# Patient Record
Sex: FEMALE | Race: WHITE | Employment: UNEMPLOYED | URBAN - METROPOLITAN AREA
[De-identification: names, ages, dates, MRNs, and addresses within clinical notes are randomized per-mention and may not be internally consistent; named-entity substitution may affect disease eponyms.]

---

## 2017-01-11 ENCOUNTER — OFFICE VISIT (OUTPATIENT)
Dept: INTERNAL MEDICINE CLINIC | Age: 77
End: 2017-01-11

## 2017-01-11 VITALS
BODY MASS INDEX: 26.68 KG/M2 | HEART RATE: 72 BPM | WEIGHT: 166 LBS | DIASTOLIC BLOOD PRESSURE: 78 MMHG | HEIGHT: 66 IN | RESPIRATION RATE: 18 BRPM | TEMPERATURE: 97.9 F | SYSTOLIC BLOOD PRESSURE: 120 MMHG

## 2017-01-11 DIAGNOSIS — Z01.818 PREOP EXAM FOR INTERNAL MEDICINE: Primary | ICD-10-CM

## 2017-01-11 DIAGNOSIS — M17.12 PRIMARY OSTEOARTHRITIS OF LEFT KNEE: ICD-10-CM

## 2017-01-11 PROCEDURE — 99244 OFF/OP CNSLTJ NEW/EST MOD 40: CPT | Performed by: INTERNAL MEDICINE

## 2017-01-11 PROCEDURE — 93000 ELECTROCARDIOGRAM COMPLETE: CPT | Performed by: INTERNAL MEDICINE

## 2017-01-11 RX ORDER — NAPROXEN 500 MG/1
TABLET ORAL
Status: ON HOLD | COMMUNITY
Start: 2016-12-15 | End: 2017-01-27 | Stop reason: HOSPADM

## 2017-01-13 ENCOUNTER — NURSE ONLY (OUTPATIENT)
Dept: INTERNAL MEDICINE CLINIC | Age: 77
End: 2017-01-13

## 2017-01-13 DIAGNOSIS — Z12.11 SPECIAL SCREENING FOR MALIGNANT NEOPLASM OF COLON: Primary | ICD-10-CM

## 2017-01-13 LAB
CONTROL: ABNORMAL
HEMOCCULT STL QL: POSITIVE

## 2017-01-13 PROCEDURE — 82274 ASSAY TEST FOR BLOOD FECAL: CPT | Performed by: INTERNAL MEDICINE

## 2017-01-16 ENCOUNTER — TELEPHONE (OUTPATIENT)
Dept: ORTHOPEDIC SURGERY | Age: 77
End: 2017-01-16

## 2017-01-18 ENCOUNTER — HOSPITAL ENCOUNTER (OUTPATIENT)
Dept: OTHER | Age: 77
Discharge: HOME | End: 2017-01-18
Attending: ORTHOPAEDIC SURGERY | Admitting: ORTHOPAEDIC SURGERY

## 2017-01-18 LAB
ABO/RH: NORMAL
ALBUMIN SERPL-MCNC: 3.9 G/DL (ref 3.4–5)
ANION GAP SERPL CALCULATED.3IONS-SCNC: 16 MMOL/L (ref 3–16)
ANTIBODY SCREEN: NORMAL
APTT: 35.1 SEC (ref 25.2–36.4)
BASOPHILS ABSOLUTE: 0.1 K/UL (ref 0–0.2)
BASOPHILS RELATIVE PERCENT: 1.3 %
BILIRUBIN URINE: NEGATIVE
BLOOD, URINE: NEGATIVE
BUN BLDV-MCNC: 28 MG/DL (ref 7–20)
CALCIUM SERPL-MCNC: 10 MG/DL (ref 8.3–10.6)
CHLORIDE BLD-SCNC: 105 MMOL/L (ref 99–110)
CLARITY: CLEAR
CO2: 24 MMOL/L (ref 21–32)
COLOR: YELLOW
CREAT SERPL-MCNC: 1 MG/DL (ref 0.6–1.2)
EOSINOPHILS ABSOLUTE: 0.3 K/UL (ref 0–0.6)
EOSINOPHILS RELATIVE PERCENT: 7.3 %
GFR AFRICAN AMERICAN: >60
GFR NON-AFRICAN AMERICAN: 54
GLUCOSE BLD-MCNC: 93 MG/DL (ref 70–99)
GLUCOSE URINE: NEGATIVE MG/DL
HCT VFR BLD CALC: 35.5 % (ref 36–48)
HEMOGLOBIN: 11.8 G/DL (ref 12–16)
INR BLD: 1.03 (ref 0.85–1.16)
KETONES, URINE: NEGATIVE MG/DL
LEUKOCYTE ESTERASE, URINE: NEGATIVE
LYMPHOCYTES ABSOLUTE: 0.9 K/UL (ref 1–5.1)
LYMPHOCYTES RELATIVE PERCENT: 20.7 %
MCH RBC QN AUTO: 31.2 PG (ref 26–34)
MCHC RBC AUTO-ENTMCNC: 33.1 G/DL (ref 31–36)
MCV RBC AUTO: 94.3 FL (ref 80–100)
MICROSCOPIC EXAMINATION: NORMAL
MONOCYTES ABSOLUTE: 0.3 K/UL (ref 0–1.3)
MONOCYTES RELATIVE PERCENT: 7.3 %
NEUTROPHILS ABSOLUTE: 2.9 K/UL (ref 1.7–7.7)
NEUTROPHILS RELATIVE PERCENT: 63.4 %
NITRITE, URINE: NEGATIVE
PDW BLD-RTO: 13.7 % (ref 12.4–15.4)
PH UA: 6.5
PLATELET # BLD: 166 K/UL (ref 135–450)
PMV BLD AUTO: 7.9 FL (ref 5–10.5)
POTASSIUM SERPL-SCNC: 4.4 MMOL/L (ref 3.5–5.1)
PROTEIN UA: NEGATIVE MG/DL
PROTHROMBIN TIME: 11.8 SEC (ref 9.8–13)
RBC # BLD: 3.76 M/UL (ref 4–5.2)
SODIUM BLD-SCNC: 145 MMOL/L (ref 136–145)
SPECIFIC GRAVITY UA: 1.01
TRANSFERRIN: 243 MG/DL (ref 200–360)
URINE TYPE: NORMAL
UROBILINOGEN, URINE: 0.2 E.U./DL
WBC # BLD: 4.5 K/UL (ref 4–11)

## 2017-01-19 ENCOUNTER — CARE COORDINATION (OUTPATIENT)
Dept: CASE MANAGEMENT | Age: 77
End: 2017-01-19

## 2017-01-19 LAB — URINE CULTURE, ROUTINE: NORMAL

## 2017-01-20 ENCOUNTER — PAT TELEPHONE (OUTPATIENT)
Dept: PREADMISSION TESTING | Age: 77
End: 2017-01-20

## 2017-01-20 VITALS — HEIGHT: 66 IN | WEIGHT: 166 LBS | BODY MASS INDEX: 26.68 KG/M2

## 2017-01-20 ASSESSMENT — PAIN DESCRIPTION - ORIENTATION: ORIENTATION: LEFT

## 2017-01-20 ASSESSMENT — PAIN DESCRIPTION - PAIN TYPE: TYPE: CHRONIC PAIN

## 2017-01-20 ASSESSMENT — PAIN DESCRIPTION - FREQUENCY: FREQUENCY: CONTINUOUS

## 2017-01-20 ASSESSMENT — PAIN DESCRIPTION - LOCATION: LOCATION: HIP

## 2017-01-20 ASSESSMENT — PAIN - FUNCTIONAL ASSESSMENT: PAIN_FUNCTIONAL_ASSESSMENT: 0-10

## 2017-01-20 ASSESSMENT — PAIN SCALES - GENERAL: PAINLEVEL_OUTOF10: 6

## 2017-01-26 PROBLEM — Z96.642 STATUS POST TOTAL REPLACEMENT OF LEFT HIP: Status: ACTIVE | Noted: 2017-01-26

## 2017-02-10 ENCOUNTER — OFFICE VISIT (OUTPATIENT)
Dept: ORTHOPEDIC SURGERY | Age: 77
End: 2017-02-10

## 2017-02-10 VITALS — WEIGHT: 166.01 LBS | HEIGHT: 66 IN | BODY MASS INDEX: 26.68 KG/M2

## 2017-02-10 DIAGNOSIS — Z96.642 STATUS POST TOTAL REPLACEMENT OF LEFT HIP: ICD-10-CM

## 2017-02-10 DIAGNOSIS — M25.552 LEFT HIP PAIN: Primary | ICD-10-CM

## 2017-02-10 PROCEDURE — 73502 X-RAY EXAM HIP UNI 2-3 VIEWS: CPT | Performed by: PHYSICIAN ASSISTANT

## 2017-02-10 PROCEDURE — 99024 POSTOP FOLLOW-UP VISIT: CPT | Performed by: PHYSICIAN ASSISTANT

## 2017-02-10 RX ORDER — AMOXICILLIN 500 MG/1
CAPSULE ORAL
Qty: 4 CAPSULE | Refills: 1 | Status: SHIPPED | OUTPATIENT
Start: 2017-02-10 | End: 2018-06-11 | Stop reason: ALTCHOICE

## 2017-03-10 ENCOUNTER — OFFICE VISIT (OUTPATIENT)
Dept: ORTHOPEDIC SURGERY | Age: 77
End: 2017-03-10

## 2017-03-10 VITALS — WEIGHT: 166.01 LBS | BODY MASS INDEX: 26.68 KG/M2 | HEIGHT: 66 IN

## 2017-03-10 DIAGNOSIS — Z96.642 STATUS POST TOTAL REPLACEMENT OF LEFT HIP: Primary | ICD-10-CM

## 2017-03-10 PROCEDURE — 99024 POSTOP FOLLOW-UP VISIT: CPT | Performed by: PHYSICIAN ASSISTANT

## 2017-03-13 ENCOUNTER — HOSPITAL ENCOUNTER (OUTPATIENT)
Dept: PHYSICAL THERAPY | Age: 77
Discharge: HOME | End: 2017-03-31
Admitting: ORTHOPAEDIC SURGERY

## 2017-03-16 ENCOUNTER — HOSPITAL ENCOUNTER (OUTPATIENT)
Dept: PHYSICAL THERAPY | Age: 77
Discharge: HOME | End: 2017-03-16
Admitting: ORTHOPAEDIC SURGERY

## 2017-03-20 ENCOUNTER — HOSPITAL ENCOUNTER (OUTPATIENT)
Dept: PHYSICAL THERAPY | Age: 77
Discharge: HOME | End: 2017-03-20
Admitting: ORTHOPAEDIC SURGERY

## 2017-03-22 ENCOUNTER — HOSPITAL ENCOUNTER (OUTPATIENT)
Dept: PHYSICAL THERAPY | Age: 77
Discharge: HOME | End: 2017-03-22
Admitting: ORTHOPAEDIC SURGERY

## 2017-03-27 ENCOUNTER — HOSPITAL ENCOUNTER (OUTPATIENT)
Dept: PHYSICAL THERAPY | Age: 77
Discharge: HOME | End: 2017-03-27
Admitting: ORTHOPAEDIC SURGERY

## 2017-03-30 ENCOUNTER — HOSPITAL ENCOUNTER (OUTPATIENT)
Dept: PHYSICAL THERAPY | Age: 77
Discharge: HOME | End: 2017-03-30
Admitting: ORTHOPAEDIC SURGERY

## 2017-04-03 ENCOUNTER — HOSPITAL ENCOUNTER (OUTPATIENT)
Dept: PHYSICAL THERAPY | Age: 77
Discharge: HOME | End: 2017-04-03
Admitting: ORTHOPAEDIC SURGERY

## 2017-04-07 ENCOUNTER — OFFICE VISIT (OUTPATIENT)
Dept: ORTHOPEDIC SURGERY | Age: 77
End: 2017-04-07

## 2017-04-07 VITALS — BODY MASS INDEX: 26.68 KG/M2 | HEIGHT: 66 IN | WEIGHT: 166.01 LBS

## 2017-04-07 DIAGNOSIS — M25.552 HIP PAIN, LEFT: Primary | ICD-10-CM

## 2017-04-07 DIAGNOSIS — Z96.642 STATUS POST TOTAL HIP REPLACEMENT, LEFT: ICD-10-CM

## 2017-04-07 PROCEDURE — 73502 X-RAY EXAM HIP UNI 2-3 VIEWS: CPT | Performed by: ORTHOPAEDIC SURGERY

## 2017-04-07 PROCEDURE — 99024 POSTOP FOLLOW-UP VISIT: CPT | Performed by: ORTHOPAEDIC SURGERY

## 2017-06-08 ENCOUNTER — OFFICE VISIT (OUTPATIENT)
Dept: INTERNAL MEDICINE CLINIC | Age: 77
End: 2017-06-08

## 2017-06-08 VITALS
RESPIRATION RATE: 16 BRPM | BODY MASS INDEX: 25.88 KG/M2 | HEIGHT: 66 IN | SYSTOLIC BLOOD PRESSURE: 106 MMHG | DIASTOLIC BLOOD PRESSURE: 68 MMHG | HEART RATE: 82 BPM | WEIGHT: 161 LBS

## 2017-06-08 DIAGNOSIS — Z00.00 MEDICARE ANNUAL WELLNESS VISIT, SUBSEQUENT: Primary | ICD-10-CM

## 2017-06-08 DIAGNOSIS — Z23 NEED FOR STREPTOCOCCUS PNEUMONIAE VACCINATION: ICD-10-CM

## 2017-06-08 DIAGNOSIS — D64.9 ANEMIA FOLLOWING SURGERY: ICD-10-CM

## 2017-06-08 LAB
HCT VFR BLD CALC: 35.9 % (ref 36–48)
HEMOGLOBIN: 11.6 G/DL (ref 12–16)

## 2017-06-08 PROCEDURE — 36415 COLL VENOUS BLD VENIPUNCTURE: CPT | Performed by: INTERNAL MEDICINE

## 2017-06-08 PROCEDURE — G0009 ADMIN PNEUMOCOCCAL VACCINE: HCPCS | Performed by: INTERNAL MEDICINE

## 2017-06-08 PROCEDURE — 90732 PPSV23 VACC 2 YRS+ SUBQ/IM: CPT | Performed by: INTERNAL MEDICINE

## 2017-06-08 PROCEDURE — G0439 PPPS, SUBSEQ VISIT: HCPCS | Performed by: INTERNAL MEDICINE

## 2017-06-08 ASSESSMENT — LIFESTYLE VARIABLES: HOW OFTEN DO YOU HAVE A DRINK CONTAINING ALCOHOL: 0

## 2017-06-08 ASSESSMENT — ANXIETY QUESTIONNAIRES: GAD7 TOTAL SCORE: 0

## 2017-06-08 ASSESSMENT — PATIENT HEALTH QUESTIONNAIRE - PHQ9: SUM OF ALL RESPONSES TO PHQ QUESTIONS 1-9: 0

## 2017-07-02 PROBLEM — E86.0 DEHYDRATION: Status: ACTIVE | Noted: 2017-07-02

## 2017-07-02 PROBLEM — D64.9 ANEMIA: Status: ACTIVE | Noted: 2017-07-02

## 2017-07-02 PROBLEM — E16.2 HYPOGLYCEMIA: Status: ACTIVE | Noted: 2017-07-02

## 2017-07-13 ENCOUNTER — OFFICE VISIT (OUTPATIENT)
Dept: INTERNAL MEDICINE CLINIC | Age: 77
End: 2017-07-13

## 2017-07-13 VITALS
DIASTOLIC BLOOD PRESSURE: 82 MMHG | WEIGHT: 165 LBS | HEART RATE: 68 BPM | RESPIRATION RATE: 16 BRPM | HEIGHT: 66 IN | SYSTOLIC BLOOD PRESSURE: 120 MMHG | BODY MASS INDEX: 26.52 KG/M2

## 2017-07-13 DIAGNOSIS — E16.2 HYPOGLYCEMIA: Primary | ICD-10-CM

## 2017-07-13 PROCEDURE — 99213 OFFICE O/P EST LOW 20 MIN: CPT | Performed by: INTERNAL MEDICINE

## 2017-07-25 ENCOUNTER — TELEPHONE (OUTPATIENT)
Dept: INTERNAL MEDICINE CLINIC | Age: 77
End: 2017-07-25

## 2017-07-25 DIAGNOSIS — E16.2 HYPOGLYCEMIA: Primary | ICD-10-CM

## 2018-04-05 ENCOUNTER — OFFICE VISIT (OUTPATIENT)
Dept: ORTHOPEDIC SURGERY | Age: 78
End: 2018-04-05

## 2018-04-05 VITALS — WEIGHT: 164.9 LBS | BODY MASS INDEX: 26.5 KG/M2 | HEIGHT: 66 IN

## 2018-04-05 DIAGNOSIS — M16.11 ARTHRITIS OF RIGHT HIP: ICD-10-CM

## 2018-04-05 DIAGNOSIS — Z96.642 STATUS POST TOTAL HIP REPLACEMENT, LEFT: ICD-10-CM

## 2018-04-05 DIAGNOSIS — M25.552 LEFT HIP PAIN: Primary | ICD-10-CM

## 2018-04-05 PROCEDURE — 99214 OFFICE O/P EST MOD 30 MIN: CPT | Performed by: ORTHOPAEDIC SURGERY

## 2018-06-11 ENCOUNTER — OFFICE VISIT (OUTPATIENT)
Dept: INTERNAL MEDICINE CLINIC | Age: 78
End: 2018-06-11

## 2018-06-11 VITALS
HEIGHT: 66 IN | RESPIRATION RATE: 18 BRPM | WEIGHT: 185 LBS | DIASTOLIC BLOOD PRESSURE: 98 MMHG | BODY MASS INDEX: 29.73 KG/M2 | HEART RATE: 84 BPM | SYSTOLIC BLOOD PRESSURE: 142 MMHG

## 2018-06-11 DIAGNOSIS — Z23 NEED FOR PROPHYLACTIC VACCINATION AND INOCULATION AGAINST VARICELLA: ICD-10-CM

## 2018-06-11 DIAGNOSIS — Z00.00 MEDICARE ANNUAL WELLNESS VISIT, SUBSEQUENT: Primary | ICD-10-CM

## 2018-06-11 DIAGNOSIS — Z71.89 ACP (ADVANCE CARE PLANNING): ICD-10-CM

## 2018-06-11 DIAGNOSIS — Z00.00 ROUTINE GENERAL MEDICAL EXAMINATION AT A HEALTH CARE FACILITY: ICD-10-CM

## 2018-06-11 PROCEDURE — 99497 ADVNCD CARE PLAN 30 MIN: CPT | Performed by: INTERNAL MEDICINE

## 2018-06-11 PROCEDURE — G0439 PPPS, SUBSEQ VISIT: HCPCS | Performed by: INTERNAL MEDICINE

## 2018-06-11 ASSESSMENT — ANXIETY QUESTIONNAIRES: GAD7 TOTAL SCORE: 0

## 2018-06-11 ASSESSMENT — LIFESTYLE VARIABLES: HOW OFTEN DO YOU HAVE A DRINK CONTAINING ALCOHOL: 0

## 2018-06-11 ASSESSMENT — PATIENT HEALTH QUESTIONNAIRE - PHQ9: SUM OF ALL RESPONSES TO PHQ QUESTIONS 1-9: 0

## 2018-09-10 ENCOUNTER — OFFICE VISIT (OUTPATIENT)
Dept: ORTHOPEDIC SURGERY | Age: 78
End: 2018-09-10

## 2018-09-10 VITALS
HEIGHT: 66 IN | HEART RATE: 72 BPM | WEIGHT: 185 LBS | BODY MASS INDEX: 29.73 KG/M2 | SYSTOLIC BLOOD PRESSURE: 123 MMHG | DIASTOLIC BLOOD PRESSURE: 74 MMHG

## 2018-09-10 DIAGNOSIS — M16.11 ARTHRITIS OF RIGHT HIP: ICD-10-CM

## 2018-09-10 DIAGNOSIS — M25.551 RIGHT HIP PAIN: Primary | ICD-10-CM

## 2018-09-10 DIAGNOSIS — Z96.642 STATUS POST TOTAL HIP REPLACEMENT, LEFT: ICD-10-CM

## 2018-09-10 PROCEDURE — 99214 OFFICE O/P EST MOD 30 MIN: CPT | Performed by: ORTHOPAEDIC SURGERY

## 2018-09-10 NOTE — PROGRESS NOTES
surgery, possible inpatient rehabilitation facility placement versus going home with home physical therapy. We also talked about visiting with [facility] postoperative physical therapy to regain range of motion and strength after the surgery. All questions were answered. The appropriate literature was given to the patient. 3. I have personally performed and/or participated in the history, exam and medical decision making and agree with all pertinent clinical information. I have also reviewed and agree with the past medical, family and social history unless otherwise noted. This dictation was performed with a verbal recognition program (DRAGON) and it was checked for errors. It is possible that there are still dictated errors within this office note. If so, please bring any errors to my attention for an addendum. All efforts were made to ensure that this office note is accurate.           Rakan Leigh MD

## 2018-09-26 PROBLEM — E86.0 DEHYDRATION: Status: RESOLVED | Noted: 2017-07-02 | Resolved: 2018-09-26

## 2018-09-28 ENCOUNTER — TELEPHONE (OUTPATIENT)
Dept: ORTHOPEDIC SURGERY | Age: 78
End: 2018-09-28

## 2018-09-28 NOTE — TELEPHONE ENCOUNTER
Loss Clinic Consult Ordered: Not Applicable    Date of [facility] Loss Clinic Appt:     BMI at time of surgery (if went through [facility]): Additional Medical Concerns:     None    Additional Recommendations for above concerns:      Discharge Disposition Information:     Attended Pre-Hab Program: NA     Anticipated Discharge Disposition:  Home with Marcus Barriga per pre-hab assessment     Who will be with patient at home following discharge?         Equipment pt already has:  Regino Eason on first or second floor: First   Bathroom on first or second floor: First   Weight bearing status: Full   Pre-op ambulatory status: none   Number of entry steps: 1 step   Caregiver assistance: Full time        Oscar Smith  9/28/2018

## 2018-10-09 ENCOUNTER — OFFICE VISIT (OUTPATIENT)
Dept: INTERNAL MEDICINE CLINIC | Age: 78
End: 2018-10-09
Payer: MEDICARE

## 2018-10-09 VITALS
HEART RATE: 68 BPM | TEMPERATURE: 98.1 F | SYSTOLIC BLOOD PRESSURE: 122 MMHG | DIASTOLIC BLOOD PRESSURE: 60 MMHG | HEIGHT: 65 IN | WEIGHT: 183 LBS | OXYGEN SATURATION: 98 % | BODY MASS INDEX: 30.49 KG/M2

## 2018-10-09 DIAGNOSIS — Z23 NEED FOR INFLUENZA VACCINATION: ICD-10-CM

## 2018-10-09 DIAGNOSIS — M16.11 ARTHRITIS OF RIGHT HIP: ICD-10-CM

## 2018-10-09 DIAGNOSIS — Z01.818 PREOP EXAM FOR INTERNAL MEDICINE: Primary | ICD-10-CM

## 2018-10-09 PROCEDURE — 99214 OFFICE O/P EST MOD 30 MIN: CPT | Performed by: INTERNAL MEDICINE

## 2018-10-09 PROCEDURE — G0008 ADMIN INFLUENZA VIRUS VAC: HCPCS | Performed by: INTERNAL MEDICINE

## 2018-10-09 PROCEDURE — 90662 IIV NO PRSV INCREASED AG IM: CPT | Performed by: INTERNAL MEDICINE

## 2018-10-09 PROCEDURE — 93000 ELECTROCARDIOGRAM COMPLETE: CPT | Performed by: INTERNAL MEDICINE

## 2018-10-09 RX ORDER — NAPROXEN SODIUM 220 MG
220 TABLET ORAL DAILY PRN
COMMUNITY
End: 2018-10-29 | Stop reason: ALTCHOICE

## 2018-10-09 NOTE — PROGRESS NOTES
 Status post total replacement of left hip    Hypoglycemia    Anemia       Past Medical History:   Diagnosis Date    Arthritis of left hip     Cancer ([facility] [address] 2014    basal cell removed from nose    Chronic midline low back pain with left-sided sciatica     Hypoglycemia      Past Surgical History:   Procedure Laterality Date    JOINT REPLACEMENT Left 01/26/2017    LEFT TOTAL HIP REPLACEMENT WITH APPLICATION OF PLATELET [address]    WISDOM TOOTH EXTRACTION  11/2016     Family History   Problem Relation Age of Onset    Dementia Mother     Cancer Sister         Lung    Cancer Brother         Prostate    Arthritis Brother     Diabetes Paternal Aunt     Diabetes Paternal Grandmother     Cancer Brother         Lung    Cancer Brother         Esophagus     Social History     Social History    Marital status:      Spouse name: N/A    Number of children: N/A    Years of education: N/A     Occupational History    Not on file. Social History Main Topics    Smoking status: Never Smoker    Smokeless tobacco: Never Used    Alcohol use 0.0 oz/week      Comment: very rare    Drug use: No    Sexual activity: Yes     Partners: Male     Other Topics Concern    Not on file     Social History Narrative    No narrative on file       Review of Systems  A comprehensive review of systems was negative except for what was noted in the HPI. Physical Exam   Constitutional: She is oriented to person, place, and time. She appears well-developed and well-nourished. No distress. HENT:   Head: Normocephalic and atraumatic. Mouth/Throat: Uvula is midline, oropharynx is clear and moist and mucous membranes are normal.   Eyes: Conjunctivae and EOM are normal. Pupils are equal, round, and reactive to light. Neck: Trachea normal and normal range of motion. Neck supple. No JVD present. Carotid bruit is not present. No mass and no thyromegaly present.    Cardiovascular: Normal rate, regular rhythm, normal heart sounds and intact distal pulses. Exam reveals no gallop and no friction rub. No murmur heard. Pulmonary/Chest: Effort normal and breath sounds normal. No respiratory distress. She has no wheezes. She has no rales. Abdominal: Soft. Normal aorta and bowel sounds are normal. She exhibits no distension and no mass. There is no hepatosplenomegaly. No tenderness. Musculoskeletal: She exhibits no edema and no tenderness. Neurological: She is alert and oriented to person, place, and time. She has normal strength. No cranial nerve deficit or sensory deficit. Coordination and gait normal.   Skin: Skin is warm and dry. No rash noted. No erythema. Psychiatric: She has a normal mood and affect. Her behavior is normal.       Lab Results   Component Value Date    LABA1C 3.8 07/02/2017    LABMICR YES 07/01/2017     Lab Results   Component Value Date     07/02/2017    K 3.7 07/02/2017     07/02/2017    CO2 22 07/02/2017    BUN 23 (H) 07/02/2017    CREATININE 1.0 07/02/2017    GLUCOSE 60 (L) 07/02/2017    CALCIUM 9.6 07/02/2017     Lab Results   Component Value Date    CHOL 177 06/07/2016    TRIG 133 06/07/2016    HDL 51 06/07/2016    LDLCALC 99 06/07/2016     Lab Results   Component Value Date    ALT 11 07/01/2017    AST 15 07/01/2017     No results found for: TSH, T4FREE  Lab Results   Component Value Date    WBC 4.6 07/02/2017    HGB 11.2 (L) 07/02/2017    HCT 32.8 (L) 07/02/2017    MCV 90.6 07/02/2017     07/02/2017     Lab Results   Component Value Date    INR 1.03 01/18/2017      No results found for: PSA   No results found for: LABURIC       EKG Interpretation:  normal EKG, normal sinus rhythm without any acute ST, T changes     Assessment:       70 y.o. patient with planned surgery as above. SURGERY SPECIFIC RISK:  none  Known risk factors for perioperative complications: None  Current medications which may produce withdrawal symptoms if withheld perioperatively: none      Plan:     1. Preoperative workup as follows: ECG and labs per orthop  2. Change in medication regimen before surgery: Discontinue NSAIDs (Aleve) 7 days before surgery  3. Prophylaxis for cardiac events with perioperative beta-blockers: Not indicated  ACC/AHA indications for pre-operative beta-blocker use:    · Vascular surgery with history of postitive stress test  · Intermediate or high risk surgery with history of CAD   · Intermediate or high risk surgery with multiple clinical predictors of CAD- 2 of the following: history of compensated or prior heart failure, history of cerebrovascular disease, DM, or renal insufficiency    Routine administration of higher-dose, long-acting metoprolol in beta-blocker-naïve patients on the day of surgery, and in the absence of dose titration is associated with an overall increase in mortality. Beta-blockers should be started days to weeks prior to surgery and titrated to pulse < 70.  4. Deep vein thrombosis prophylaxis: regimen to be chosen by surgical team  5.  No contraindications to planned surgery    Ravi Menjivar was seen today for pre-op exam.    Diagnoses and all orders for this visit:    Preop exam for internal medicine  -     EKG 12 lead    Arthritis of right hip              [facility]  Office: [phone]  Fax: [phone]

## 2018-10-16 ENCOUNTER — TELEPHONE (OUTPATIENT)
Dept: ORTHOPEDIC SURGERY | Age: 78
End: 2018-10-16

## 2018-10-16 ENCOUNTER — HOSPITAL ENCOUNTER (OUTPATIENT)
Dept: PREADMISSION TESTING | Age: 78
Discharge: HOME | End: 2018-10-20
Payer: MEDICARE

## 2018-10-16 LAB
ALBUMIN SERPL-MCNC: 4.8 G/DL (ref 3.4–5)
ANION GAP SERPL CALCULATED.3IONS-SCNC: 17 MMOL/L (ref 3–16)
APTT: 32.5 SEC (ref 26–36)
BACTERIA: ABNORMAL /HPF
BASOPHILS ABSOLUTE: 0.1 K/UL (ref 0–0.2)
BASOPHILS RELATIVE PERCENT: 1.1 %
BILIRUBIN URINE: NEGATIVE
BLOOD, URINE: NEGATIVE
BUN BLDV-MCNC: 25 MG/DL (ref 7–20)
CALCIUM SERPL-MCNC: 10.2 MG/DL (ref 8.3–10.6)
CHLORIDE BLD-SCNC: 105 MMOL/L (ref 99–110)
CLARITY: CLEAR
CO2: 21 MMOL/L (ref 21–32)
COLOR: YELLOW
CREAT SERPL-MCNC: 1.4 MG/DL (ref 0.6–1.2)
EOSINOPHILS ABSOLUTE: 0.3 K/UL (ref 0–0.6)
EOSINOPHILS RELATIVE PERCENT: 6.3 %
EPITHELIAL CELLS, UA: ABNORMAL /HPF
GFR AFRICAN AMERICAN: 44
GFR NON-AFRICAN AMERICAN: 37
GLUCOSE BLD-MCNC: 93 MG/DL (ref 70–99)
GLUCOSE URINE: NEGATIVE MG/DL
HCT VFR BLD CALC: 33.6 % (ref 36–48)
HEMOGLOBIN: 11.3 G/DL (ref 12–16)
INR BLD: 0.97 (ref 0.86–1.14)
KETONES, URINE: NEGATIVE MG/DL
LEUKOCYTE ESTERASE, URINE: ABNORMAL
LYMPHOCYTES ABSOLUTE: 1 K/UL (ref 1–5.1)
LYMPHOCYTES RELATIVE PERCENT: 20 %
MCH RBC QN AUTO: 32.5 PG (ref 26–34)
MCHC RBC AUTO-ENTMCNC: 33.7 G/DL (ref 31–36)
MCV RBC AUTO: 96.6 FL (ref 80–100)
MICROSCOPIC EXAMINATION: YES
MONOCYTES ABSOLUTE: 0.3 K/UL (ref 0–1.3)
MONOCYTES RELATIVE PERCENT: 6.8 %
NEUTROPHILS ABSOLUTE: 3.2 K/UL (ref 1.7–7.7)
NEUTROPHILS RELATIVE PERCENT: 65.8 %
NITRITE, URINE: NEGATIVE
PDW BLD-RTO: 14 % (ref 12.4–15.4)
PH UA: 5.5
PLATELET # BLD: 187 K/UL (ref 135–450)
PMV BLD AUTO: 7.7 FL (ref 5–10.5)
POTASSIUM SERPL-SCNC: 4.6 MMOL/L (ref 3.5–5.1)
PROTEIN UA: NEGATIVE MG/DL
PROTHROMBIN TIME: 11.1 SEC (ref 9.8–13)
RBC # BLD: 3.48 M/UL (ref 4–5.2)
RBC UA: ABNORMAL /HPF (ref 0–2)
SODIUM BLD-SCNC: 143 MMOL/L (ref 136–145)
SPECIFIC GRAVITY UA: 1.01
TRANSFERRIN: 255 MG/DL (ref 200–360)
URINE TYPE: ABNORMAL
UROBILINOGEN, URINE: 0.2 E.U./DL
WBC # BLD: 4.9 K/UL (ref 4–11)
WBC UA: ABNORMAL /HPF (ref 0–5)

## 2018-10-16 PROCEDURE — 81001 URINALYSIS AUTO W/SCOPE: CPT

## 2018-10-16 PROCEDURE — 85730 THROMBOPLASTIN TIME PARTIAL: CPT

## 2018-10-16 PROCEDURE — 82040 ASSAY OF SERUM ALBUMIN: CPT

## 2018-10-16 PROCEDURE — 83036 HEMOGLOBIN GLYCOSYLATED A1C: CPT

## 2018-10-16 PROCEDURE — 87186 SC STD MICRODIL/AGAR DIL: CPT

## 2018-10-16 PROCEDURE — 87086 URINE CULTURE/COLONY COUNT: CPT

## 2018-10-16 PROCEDURE — 87077 CULTURE AEROBIC IDENTIFY: CPT

## 2018-10-16 PROCEDURE — 36415 COLL VENOUS BLD VENIPUNCTURE: CPT

## 2018-10-16 PROCEDURE — 85610 PROTHROMBIN TIME: CPT

## 2018-10-16 PROCEDURE — 80048 BASIC METABOLIC PNL TOTAL CA: CPT

## 2018-10-16 PROCEDURE — 85025 COMPLETE CBC W/AUTO DIFF WBC: CPT

## 2018-10-16 PROCEDURE — 84466 ASSAY OF TRANSFERRIN: CPT

## 2018-10-17 LAB
ESTIMATED AVERAGE GLUCOSE: 59.5 MG/DL
HBA1C MFR BLD: 3.7 %

## 2018-10-18 LAB
ORGANISM: ABNORMAL
URINE CULTURE, ROUTINE: ABNORMAL
URINE CULTURE, ROUTINE: ABNORMAL

## 2018-10-18 RX ORDER — CIPROFLOXACIN 500 MG/1
500 TABLET, FILM COATED ORAL 2 TIMES DAILY
Qty: 20 TABLET | Refills: 0 | Status: SHIPPED | OUTPATIENT
Start: 2018-10-18 | End: 2018-10-29

## 2018-10-24 ENCOUNTER — ANESTHESIA EVENT (OUTPATIENT)
Dept: OPERATING ROOM | Age: 78
DRG: 470 | End: 2018-10-24
Payer: MEDICARE

## 2018-10-24 NOTE — ANESTHESIA PRE PROCEDURE
Department of Anesthesiology  Preprocedure Note       Name:  Mary Jane Rodrigues   Age:  70 y.o.  :  1940                                          MRN:  [id number]         Date:  10/25/2018      Surgeon:  Ovidio Ng MD    Procedure: RIGHT TOTAL HIP REPLACEMENT WITH CELLSAVER AND PLATELET GEL                   HPI:  The patient is a 70 y.o. female who had a LEFT hip replacement done some time ago is doing very well and this regard. She now has disabling right hip pain secondary to severe arthritis of the right hip. The patient gives no low back history but she has been having some ipsilateral medial sided right knee pain. She had similar symptoms on the LEFT side before her surgery. No numbness or tingling.     Medications prior to admission:   ciprofloxacin (CIPRO) 500 MG tablet Take 1 tablet by mouth 2 times daily for 10 days   naproxen sodium (ALEVE) 220 MG tablet Take 220 mg by mouth daily as needed for Pain   Multiple Vitamins-Minerals (THERAPEUTIC MULTIVITAMIN-MINERALS) tablet Take 1 tablet by mouth daily   Cyanocobalamin (B-12) 100 MCG TABS Take by mouth   calcium carbonate (OSCAL) 500 MG TABS  Take 500 mg by mouth daily     Allergies:     Celebrex [Celecoxib]      Leg cramps    Sulfa Antibiotics Hives and Diarrhea     Problem List:     Chronic midline low back pain with left-sided sciatica M54.42, G89.29    Breast density R92.2    Hypoglycemia E16.2    Anemia D64.9    Arthritis of right hip M16.11     Past Medical History:     Arthritis of left hip     Cancer ([facility] [address]     basal cell removed from nose    Chronic midline low back pain with left-sided sciatica     Hypoglycemia     Status post total replacement of left hip 2017     Past Surgical History:     JOINT REPLACEMENT Left 2017    LEFT TOTAL HIP REPLACEMENT WITH APPLICATION OF PLATELET RICH    WISDOM TOOTH EXTRACTION  2016     Social History:     Smoking status: Never Smoker    Smokeless tobacco: Never Used    Alcohol use 0.0 oz/week      Comment: very rare     Vital Signs (Current):   BP: 139/76 Pulse: 96   Resp: 18 SpO2: 98   Temp: 98.2 °F (36.8 °C)   Height: 5' 5\" (1.651 m)  (10/25/18) Weight: 180 lb (81.6 kg)  (10/25/18)   BMI: 30                        BP Readings from Last 3 Encounters:   10/09/18 122/60   09/10/18 123/74   06/11/18 (!) 142/98     NPO Status: >8 hrs    EKG: Sinus  Rhythm 68; Nl axis; Nonspecific ST depression    CBC:    WBC 4.9 10/16/2018    HGB 11.3 10/16/2018    HCT 33.6 10/16/2018     10/16/2018     CMP:     10/16/2018    K 4.6 10/16/2018     10/16/2018    CO2 21 10/16/2018    BUN 25 10/16/2018    CREATININE 1.4 10/16/2018    GLUCOSE 93 10/16/2018    PROT 6.8 07/01/2017    CALCIUM 10.2 10/16/2018    BILITOT 0.4 07/01/2017    ALKPHOS 91 07/01/2017    AST 15 07/01/2017    ALT 11 07/01/2017     Coags:    PROTIME 11.1 10/16/2018    INR 0.97 10/16/2018    APTT 32.5 10/16/2018     Anesthesia Evaluation  Patient summary reviewed and Nursing notes reviewed  Airway: Mallampati: I  TM distance: >3 FB   Neck ROM: full  Mouth opening: > = 3 FB Dental: normal exam         Pulmonary:       (-) COPD, asthma, sleep apnea and not a current smoker                           Cardiovascular:  Exercise tolerance: good (>4 METS),       (-) hypertension,  angina and  CERON    ECG reviewed                        Neuro/Psych:   (+) neuromuscular disease:,    (-) TIA, headaches and psychiatric history            ROS comment: Chronic low back pain GI/Hepatic/Renal:   (+) renal disease: CRI,          ROS comment: Cr 1.4 (Stage III). Endo/Other:    (+) blood dyscrasia: anemia, arthritis: OA., .    (-) diabetes mellitus, hypothyroidism               Abdominal:           Vascular:     - DVT and PE. Anesthesia Plan      general     ASA 3     (FI block for post-op pain management)  Induction: intravenous. MIPS: Prophylactic antiemetics administered.   Anesthetic plan and risks

## 2018-10-25 ENCOUNTER — APPOINTMENT (OUTPATIENT)
Dept: GENERAL RADIOLOGY | Age: 78
DRG: 470 | End: 2018-10-25
Attending: ORTHOPAEDIC SURGERY
Payer: MEDICARE

## 2018-10-25 ENCOUNTER — ANESTHESIA (OUTPATIENT)
Dept: OPERATING ROOM | Age: 78
DRG: 470 | End: 2018-10-25
Payer: MEDICARE

## 2018-10-25 ENCOUNTER — HOSPITAL ENCOUNTER (INPATIENT)
Age: 78
LOS: 1 days | Discharge: HOME HEALTH | DRG: 470 | End: 2018-10-26
Attending: ORTHOPAEDIC SURGERY | Admitting: ORTHOPAEDIC SURGERY
Payer: MEDICARE

## 2018-10-25 VITALS
SYSTOLIC BLOOD PRESSURE: 126 MMHG | TEMPERATURE: 98.6 F | DIASTOLIC BLOOD PRESSURE: 85 MMHG | OXYGEN SATURATION: 100 % | RESPIRATION RATE: 21 BRPM

## 2018-10-25 DIAGNOSIS — Z96.641 STATUS POST TOTAL HIP REPLACEMENT, RIGHT: ICD-10-CM

## 2018-10-25 DIAGNOSIS — M16.11 PRIMARY OSTEOARTHRITIS OF RIGHT HIP: Primary | ICD-10-CM

## 2018-10-25 LAB
ABO/RH: NORMAL
ANION GAP SERPL CALCULATED.3IONS-SCNC: 15 MMOL/L (ref 3–16)
ANTIBODY SCREEN: NORMAL
BILIRUBIN URINE: NEGATIVE
BLOOD, URINE: NEGATIVE
BUN BLDV-MCNC: 23 MG/DL (ref 7–20)
CALCIUM SERPL-MCNC: 9.2 MG/DL (ref 8.3–10.6)
CHLORIDE BLD-SCNC: 104 MMOL/L (ref 99–110)
CLARITY: CLEAR
CO2: 19 MMOL/L (ref 21–32)
COLOR: YELLOW
CREAT SERPL-MCNC: 1.1 MG/DL (ref 0.6–1.2)
GFR AFRICAN AMERICAN: 58
GFR NON-AFRICAN AMERICAN: 48
GLUCOSE BLD-MCNC: 106 MG/DL (ref 70–99)
GLUCOSE BLD-MCNC: 233 MG/DL (ref 70–99)
GLUCOSE URINE: 250 MG/DL
KETONES, URINE: 15 MG/DL
LEUKOCYTE ESTERASE, URINE: NEGATIVE
MICROSCOPIC EXAMINATION: ABNORMAL
NITRITE, URINE: NEGATIVE
PERFORMED ON: ABNORMAL
PH UA: 6
POTASSIUM SERPL-SCNC: 3.9 MMOL/L (ref 3.5–5.1)
PROTEIN UA: NEGATIVE MG/DL
SODIUM BLD-SCNC: 138 MMOL/L (ref 136–145)
SPECIFIC GRAVITY UA: 1.02
URINE TYPE: ABNORMAL
UROBILINOGEN, URINE: 0.2 E.U./DL

## 2018-10-25 PROCEDURE — G8978 MOBILITY CURRENT STATUS: HCPCS

## 2018-10-25 PROCEDURE — 7100000001 HC PACU RECOVERY - ADDTL 15 MIN: Performed by: ORTHOPAEDIC SURGERY

## 2018-10-25 PROCEDURE — 88311 DECALCIFY TISSUE: CPT

## 2018-10-25 PROCEDURE — 2580000003 HC RX 258: Performed by: ANESTHESIOLOGY

## 2018-10-25 PROCEDURE — 2500000003 HC RX 250 WO HCPCS: Performed by: ANESTHESIOLOGY

## 2018-10-25 PROCEDURE — 73501 X-RAY EXAM HIP UNI 1 VIEW: CPT

## 2018-10-25 PROCEDURE — 86900 BLOOD TYPING SEROLOGIC ABO: CPT

## 2018-10-25 PROCEDURE — 6360000002 HC RX W HCPCS: Performed by: ORTHOPAEDIC SURGERY

## 2018-10-25 PROCEDURE — 3600000006 HC SURGERY LEVEL 6 BASE: Performed by: ORTHOPAEDIC SURGERY

## 2018-10-25 PROCEDURE — 6360000002 HC RX W HCPCS: Performed by: PHYSICIAN ASSISTANT

## 2018-10-25 PROCEDURE — 0SR901A REPLACEMENT OF RIGHT HIP JOINT WITH METAL SYNTHETIC SUBSTITUTE, UNCEMENTED, OPEN APPROACH: ICD-10-PCS | Performed by: ORTHOPAEDIC SURGERY

## 2018-10-25 PROCEDURE — 3700000001 HC ADD 15 MINUTES (ANESTHESIA): Performed by: ORTHOPAEDIC SURGERY

## 2018-10-25 PROCEDURE — 6360000002 HC RX W HCPCS: Performed by: ANESTHESIOLOGY

## 2018-10-25 PROCEDURE — 2500000003 HC RX 250 WO HCPCS: Performed by: NURSE ANESTHETIST, CERTIFIED REGISTERED

## 2018-10-25 PROCEDURE — 86850 RBC ANTIBODY SCREEN: CPT

## 2018-10-25 PROCEDURE — 97110 THERAPEUTIC EXERCISES: CPT

## 2018-10-25 PROCEDURE — 97166 OT EVAL MOD COMPLEX 45 MIN: CPT

## 2018-10-25 PROCEDURE — G8987 SELF CARE CURRENT STATUS: HCPCS

## 2018-10-25 PROCEDURE — G8979 MOBILITY GOAL STATUS: HCPCS

## 2018-10-25 PROCEDURE — 80048 BASIC METABOLIC PNL TOTAL CA: CPT

## 2018-10-25 PROCEDURE — 6370000000 HC RX 637 (ALT 250 FOR IP): Performed by: PHYSICIAN ASSISTANT

## 2018-10-25 PROCEDURE — 2720000010 HC SURG SUPPLY STERILE: Performed by: ORTHOPAEDIC SURGERY

## 2018-10-25 PROCEDURE — 6360000002 HC RX W HCPCS: Performed by: NURSE ANESTHETIST, CERTIFIED REGISTERED

## 2018-10-25 PROCEDURE — 3700000000 HC ANESTHESIA ATTENDED CARE: Performed by: ORTHOPAEDIC SURGERY

## 2018-10-25 PROCEDURE — 6370000000 HC RX 637 (ALT 250 FOR IP)

## 2018-10-25 PROCEDURE — C9290 INJ, BUPIVACAINE LIPOSOME: HCPCS | Performed by: ORTHOPAEDIC SURGERY

## 2018-10-25 PROCEDURE — 97162 PT EVAL MOD COMPLEX 30 MIN: CPT

## 2018-10-25 PROCEDURE — 7100000000 HC PACU RECOVERY - FIRST 15 MIN: Performed by: ORTHOPAEDIC SURGERY

## 2018-10-25 PROCEDURE — 2500000003 HC RX 250 WO HCPCS

## 2018-10-25 PROCEDURE — 2580000003 HC RX 258: Performed by: PHYSICIAN ASSISTANT

## 2018-10-25 PROCEDURE — 97535 SELF CARE MNGMENT TRAINING: CPT

## 2018-10-25 PROCEDURE — 81003 URINALYSIS AUTO W/O SCOPE: CPT

## 2018-10-25 PROCEDURE — 2709999900 HC NON-CHARGEABLE SUPPLY: Performed by: ORTHOPAEDIC SURGERY

## 2018-10-25 PROCEDURE — 36415 COLL VENOUS BLD VENIPUNCTURE: CPT

## 2018-10-25 PROCEDURE — G8988 SELF CARE GOAL STATUS: HCPCS

## 2018-10-25 PROCEDURE — 6370000000 HC RX 637 (ALT 250 FOR IP): Performed by: ANESTHESIOLOGY

## 2018-10-25 PROCEDURE — C1776 JOINT DEVICE (IMPLANTABLE): HCPCS | Performed by: ORTHOPAEDIC SURGERY

## 2018-10-25 PROCEDURE — 76942 ECHO GUIDE FOR BIOPSY: CPT | Performed by: ANESTHESIOLOGY

## 2018-10-25 PROCEDURE — 97116 GAIT TRAINING THERAPY: CPT

## 2018-10-25 PROCEDURE — 1200000000 HC SEMI PRIVATE

## 2018-10-25 PROCEDURE — 88304 TISSUE EXAM BY PATHOLOGIST: CPT

## 2018-10-25 PROCEDURE — 2500000003 HC RX 250 WO HCPCS: Performed by: ORTHOPAEDIC SURGERY

## 2018-10-25 PROCEDURE — 3600000016 HC SURGERY LEVEL 6 ADDTL 15MIN: Performed by: ORTHOPAEDIC SURGERY

## 2018-10-25 PROCEDURE — 2580000003 HC RX 258: Performed by: ORTHOPAEDIC SURGERY

## 2018-10-25 PROCEDURE — 86901 BLOOD TYPING SEROLOGIC RH(D): CPT

## 2018-10-25 PROCEDURE — 0QU40KZ SUPPLEMENT RIGHT ACETABULUM WITH NONAUTOLOGOUS TISSUE SUBSTITUTE, OPEN APPROACH: ICD-10-PCS | Performed by: ORTHOPAEDIC SURGERY

## 2018-10-25 DEVICE — R3 20 DEGREE XLPE ACETABULAR LINER                                    36MM INNER DIAMETER X OUTER DIAMETER 54MM
Type: IMPLANTABLE DEVICE | Site: HIP | Status: FUNCTIONAL
Brand: R3

## 2018-10-25 DEVICE — REFLECTION SPHERICAL HEAD SCREW 25MM
Type: IMPLANTABLE DEVICE | Site: HIP | Status: FUNCTIONAL
Brand: REFLECTION

## 2018-10-25 DEVICE — SYNERGY POROUS FEMORAL COMPONENT SZ 14
Type: IMPLANTABLE DEVICE | Site: HIP | Status: FUNCTIONAL
Brand: SYNERGY

## 2018-10-25 DEVICE — R3 3 HOLE ACETABULAR SHELL 54MM
Type: IMPLANTABLE DEVICE | Site: HIP | Status: FUNCTIONAL
Brand: R3 ACETABULAR

## 2018-10-25 DEVICE — COBALT CHROME 12/14 TAPER FEMORAL                                    HEAD 36MM +0: Type: IMPLANTABLE DEVICE | Site: HIP | Status: FUNCTIONAL

## 2018-10-25 RX ORDER — FENTANYL CITRATE 50 UG/ML
INJECTION, SOLUTION INTRAMUSCULAR; INTRAVENOUS PRN
Status: DISCONTINUED | OUTPATIENT
Start: 2018-10-25 | End: 2018-10-25 | Stop reason: SDUPTHER

## 2018-10-25 RX ORDER — SODIUM CHLORIDE, SODIUM LACTATE, POTASSIUM CHLORIDE, CALCIUM CHLORIDE 600; 310; 30; 20 MG/100ML; MG/100ML; MG/100ML; MG/100ML
INJECTION, SOLUTION INTRAVENOUS CONTINUOUS
Status: DISCONTINUED | OUTPATIENT
Start: 2018-10-25 | End: 2018-10-25

## 2018-10-25 RX ORDER — EPHEDRINE SULFATE 50 MG/ML
INJECTION INTRAVENOUS PRN
Status: DISCONTINUED | OUTPATIENT
Start: 2018-10-25 | End: 2018-10-25 | Stop reason: SDUPTHER

## 2018-10-25 RX ORDER — BUPIVACAINE HYDROCHLORIDE 2.5 MG/ML
INJECTION, SOLUTION EPIDURAL; INFILTRATION; INTRACAUDAL PRN
Status: DISCONTINUED | OUTPATIENT
Start: 2018-10-25 | End: 2018-10-25 | Stop reason: SDUPTHER

## 2018-10-25 RX ORDER — DEXAMETHASONE SODIUM PHOSPHATE 4 MG/ML
INJECTION, SOLUTION INTRA-ARTICULAR; INTRALESIONAL; INTRAMUSCULAR; INTRAVENOUS; SOFT TISSUE PRN
Status: DISCONTINUED | OUTPATIENT
Start: 2018-10-25 | End: 2018-10-25 | Stop reason: SDUPTHER

## 2018-10-25 RX ORDER — HYDRALAZINE HYDROCHLORIDE 20 MG/ML
5 INJECTION INTRAMUSCULAR; INTRAVENOUS EVERY 10 MIN PRN
Status: DISCONTINUED | OUTPATIENT
Start: 2018-10-25 | End: 2018-10-25 | Stop reason: HOSPADM

## 2018-10-25 RX ORDER — M-VIT,TX,IRON,MINS/CALC/FOLIC 27MG-0.4MG
1 TABLET ORAL DAILY
Status: DISCONTINUED | OUTPATIENT
Start: 2018-10-25 | End: 2018-10-26 | Stop reason: HOSPADM

## 2018-10-25 RX ORDER — CIPROFLOXACIN 500 MG/1
500 TABLET, FILM COATED ORAL 2 TIMES DAILY
Status: DISCONTINUED | OUTPATIENT
Start: 2018-10-25 | End: 2018-10-26 | Stop reason: HOSPADM

## 2018-10-25 RX ORDER — MORPHINE SULFATE 4 MG/ML
4 INJECTION, SOLUTION INTRAMUSCULAR; INTRAVENOUS
Status: DISCONTINUED | OUTPATIENT
Start: 2018-10-25 | End: 2018-10-26 | Stop reason: HOSPADM

## 2018-10-25 RX ORDER — SODIUM CHLORIDE 0.9 % (FLUSH) 0.9 %
10 SYRINGE (ML) INJECTION EVERY 12 HOURS SCHEDULED
Status: DISCONTINUED | OUTPATIENT
Start: 2018-10-25 | End: 2018-10-26 | Stop reason: HOSPADM

## 2018-10-25 RX ORDER — OXYCODONE HYDROCHLORIDE 5 MG/1
10 TABLET ORAL EVERY 4 HOURS PRN
Status: DISCONTINUED | OUTPATIENT
Start: 2018-10-25 | End: 2018-10-26 | Stop reason: HOSPADM

## 2018-10-25 RX ORDER — SODIUM CHLORIDE 0.9 % (FLUSH) 0.9 %
10 SYRINGE (ML) INJECTION PRN
Status: DISCONTINUED | OUTPATIENT
Start: 2018-10-25 | End: 2018-10-26 | Stop reason: HOSPADM

## 2018-10-25 RX ORDER — OXYCODONE HYDROCHLORIDE 5 MG/1
5 TABLET ORAL EVERY 4 HOURS PRN
Status: DISCONTINUED | OUTPATIENT
Start: 2018-10-25 | End: 2018-10-26 | Stop reason: HOSPADM

## 2018-10-25 RX ORDER — LIDOCAINE HYDROCHLORIDE 10 MG/ML
0.3 INJECTION, SOLUTION EPIDURAL; INFILTRATION; INTRACAUDAL; PERINEURAL
Status: DISCONTINUED | OUTPATIENT
Start: 2018-10-25 | End: 2018-10-25 | Stop reason: HOSPADM

## 2018-10-25 RX ORDER — GLYCOPYRROLATE 0.2 MG/ML
INJECTION INTRAMUSCULAR; INTRAVENOUS PRN
Status: DISCONTINUED | OUTPATIENT
Start: 2018-10-25 | End: 2018-10-25 | Stop reason: SDUPTHER

## 2018-10-25 RX ORDER — DOCUSATE SODIUM 100 MG/1
100 CAPSULE, LIQUID FILLED ORAL 2 TIMES DAILY
Status: DISCONTINUED | OUTPATIENT
Start: 2018-10-25 | End: 2018-10-26 | Stop reason: HOSPADM

## 2018-10-25 RX ORDER — SODIUM CHLORIDE 0.9 % (FLUSH) 0.9 %
10 SYRINGE (ML) INJECTION PRN
Status: DISCONTINUED | OUTPATIENT
Start: 2018-10-25 | End: 2018-10-25 | Stop reason: HOSPADM

## 2018-10-25 RX ORDER — SODIUM CHLORIDE 9 MG/ML
INJECTION, SOLUTION INTRAVENOUS CONTINUOUS
Status: DISCONTINUED | OUTPATIENT
Start: 2018-10-25 | End: 2018-10-26 | Stop reason: HOSPADM

## 2018-10-25 RX ORDER — ROCURONIUM BROMIDE 10 MG/ML
INJECTION, SOLUTION INTRAVENOUS PRN
Status: DISCONTINUED | OUTPATIENT
Start: 2018-10-25 | End: 2018-10-25 | Stop reason: SDUPTHER

## 2018-10-25 RX ORDER — TRANEXAMIC ACID 100 MG/ML
INJECTION, SOLUTION INTRAVENOUS PRN
Status: DISCONTINUED | OUTPATIENT
Start: 2018-10-25 | End: 2018-10-25 | Stop reason: SDUPTHER

## 2018-10-25 RX ORDER — FENTANYL CITRATE 50 UG/ML
25 INJECTION, SOLUTION INTRAMUSCULAR; INTRAVENOUS EVERY 5 MIN PRN
Status: DISCONTINUED | OUTPATIENT
Start: 2018-10-25 | End: 2018-10-25 | Stop reason: HOSPADM

## 2018-10-25 RX ORDER — ACETAMINOPHEN 500 MG
1000 TABLET ORAL ONCE
Status: COMPLETED | OUTPATIENT
Start: 2018-10-25 | End: 2018-10-25

## 2018-10-25 RX ORDER — MEPERIDINE HYDROCHLORIDE 50 MG/ML
12.5 INJECTION INTRAMUSCULAR; INTRAVENOUS; SUBCUTANEOUS EVERY 5 MIN PRN
Status: DISCONTINUED | OUTPATIENT
Start: 2018-10-25 | End: 2018-10-25 | Stop reason: HOSPADM

## 2018-10-25 RX ORDER — CALCIUM CARBONATE 500(1250)
500 TABLET ORAL DAILY
Status: DISCONTINUED | OUTPATIENT
Start: 2018-10-25 | End: 2018-10-26 | Stop reason: HOSPADM

## 2018-10-25 RX ORDER — OXYCODONE HYDROCHLORIDE AND ACETAMINOPHEN 5; 325 MG/1; MG/1
2 TABLET ORAL PRN
Status: DISCONTINUED | OUTPATIENT
Start: 2018-10-25 | End: 2018-10-25 | Stop reason: HOSPADM

## 2018-10-25 RX ORDER — HYDROMORPHONE HCL 110MG/55ML
PATIENT CONTROLLED ANALGESIA SYRINGE INTRAVENOUS PRN
Status: DISCONTINUED | OUTPATIENT
Start: 2018-10-25 | End: 2018-10-25 | Stop reason: SDUPTHER

## 2018-10-25 RX ORDER — MIDAZOLAM HYDROCHLORIDE 1 MG/ML
INJECTION INTRAMUSCULAR; INTRAVENOUS PRN
Status: DISCONTINUED | OUTPATIENT
Start: 2018-10-25 | End: 2018-10-25

## 2018-10-25 RX ORDER — ONDANSETRON 2 MG/ML
4 INJECTION INTRAMUSCULAR; INTRAVENOUS
Status: DISCONTINUED | OUTPATIENT
Start: 2018-10-25 | End: 2018-10-25 | Stop reason: HOSPADM

## 2018-10-25 RX ORDER — SODIUM CHLORIDE 0.9 % (FLUSH) 0.9 %
10 SYRINGE (ML) INJECTION EVERY 12 HOURS SCHEDULED
Status: DISCONTINUED | OUTPATIENT
Start: 2018-10-25 | End: 2018-10-25 | Stop reason: HOSPADM

## 2018-10-25 RX ORDER — OXYCODONE HYDROCHLORIDE AND ACETAMINOPHEN 5; 325 MG/1; MG/1
1 TABLET ORAL PRN
Status: DISCONTINUED | OUTPATIENT
Start: 2018-10-25 | End: 2018-10-25 | Stop reason: HOSPADM

## 2018-10-25 RX ORDER — ACETAMINOPHEN 325 MG/1
650 TABLET ORAL EVERY 4 HOURS PRN
Status: DISCONTINUED | OUTPATIENT
Start: 2018-10-25 | End: 2018-10-26 | Stop reason: HOSPADM

## 2018-10-25 RX ORDER — CYCLOBENZAPRINE HCL 10 MG
10 TABLET ORAL 3 TIMES DAILY PRN
Status: DISCONTINUED | OUTPATIENT
Start: 2018-10-25 | End: 2018-10-26 | Stop reason: HOSPADM

## 2018-10-25 RX ORDER — MIDAZOLAM HYDROCHLORIDE 1 MG/ML
INJECTION INTRAMUSCULAR; INTRAVENOUS PRN
Status: DISCONTINUED | OUTPATIENT
Start: 2018-10-25 | End: 2018-10-25 | Stop reason: SDUPTHER

## 2018-10-25 RX ORDER — ONDANSETRON 2 MG/ML
INJECTION INTRAMUSCULAR; INTRAVENOUS PRN
Status: DISCONTINUED | OUTPATIENT
Start: 2018-10-25 | End: 2018-10-25 | Stop reason: SDUPTHER

## 2018-10-25 RX ORDER — LIDOCAINE HYDROCHLORIDE 20 MG/ML
INJECTION, SOLUTION EPIDURAL; INFILTRATION; INTRACAUDAL; PERINEURAL PRN
Status: DISCONTINUED | OUTPATIENT
Start: 2018-10-25 | End: 2018-10-25 | Stop reason: SDUPTHER

## 2018-10-25 RX ORDER — MORPHINE SULFATE 2 MG/ML
2 INJECTION, SOLUTION INTRAMUSCULAR; INTRAVENOUS
Status: DISCONTINUED | OUTPATIENT
Start: 2018-10-25 | End: 2018-10-26 | Stop reason: HOSPADM

## 2018-10-25 RX ORDER — PROPOFOL 10 MG/ML
INJECTION, EMULSION INTRAVENOUS PRN
Status: DISCONTINUED | OUTPATIENT
Start: 2018-10-25 | End: 2018-10-25 | Stop reason: SDUPTHER

## 2018-10-25 RX ORDER — ONDANSETRON 2 MG/ML
4 INJECTION INTRAMUSCULAR; INTRAVENOUS EVERY 6 HOURS PRN
Status: DISCONTINUED | OUTPATIENT
Start: 2018-10-25 | End: 2018-10-26 | Stop reason: HOSPADM

## 2018-10-25 RX ADMIN — FENTANYL CITRATE 50 MCG: 50 INJECTION INTRAMUSCULAR; INTRAVENOUS at 09:20

## 2018-10-25 RX ADMIN — PHENYLEPHRINE HYDROCHLORIDE 100 MCG: 10 INJECTION INTRAVENOUS at 09:39

## 2018-10-25 RX ADMIN — FENTANYL CITRATE 50 MCG: 50 INJECTION INTRAMUSCULAR; INTRAVENOUS at 09:39

## 2018-10-25 RX ADMIN — ROCURONIUM BROMIDE 10 MG: 10 SOLUTION INTRAVENOUS at 10:19

## 2018-10-25 RX ADMIN — PHENYLEPHRINE HYDROCHLORIDE 100 MCG: 10 INJECTION INTRAVENOUS at 10:28

## 2018-10-25 RX ADMIN — CIPROFLOXACIN 500 MG: 500 TABLET, FILM COATED ORAL at 21:00

## 2018-10-25 RX ADMIN — PHENYLEPHRINE HYDROCHLORIDE 100 MCG: 10 INJECTION INTRAVENOUS at 10:26

## 2018-10-25 RX ADMIN — EPHEDRINE SULFATE 10 MG: 50 INJECTION INTRAVENOUS at 10:30

## 2018-10-25 RX ADMIN — SUGAMMADEX 100 MG: 100 INJECTION, SOLUTION INTRAVENOUS at 10:41

## 2018-10-25 RX ADMIN — SODIUM CHLORIDE, POTASSIUM CHLORIDE, SODIUM LACTATE AND CALCIUM CHLORIDE: 600; 310; 30; 20 INJECTION, SOLUTION INTRAVENOUS at 08:03

## 2018-10-25 RX ADMIN — DOCUSATE SODIUM 100 MG: 100 CAPSULE, LIQUID FILLED ORAL at 21:00

## 2018-10-25 RX ADMIN — GLYCOPYRROLATE 0.2 MG: 0.2 INJECTION, SOLUTION INTRAMUSCULAR; INTRAVENOUS at 10:27

## 2018-10-25 RX ADMIN — MIDAZOLAM HYDROCHLORIDE 2 MG: 2 INJECTION, SOLUTION INTRAMUSCULAR; INTRAVENOUS at 08:52

## 2018-10-25 RX ADMIN — SODIUM CHLORIDE, POTASSIUM CHLORIDE, SODIUM LACTATE AND CALCIUM CHLORIDE: 600; 310; 30; 20 INJECTION, SOLUTION INTRAVENOUS at 08:40

## 2018-10-25 RX ADMIN — ROCURONIUM BROMIDE 50 MG: 10 SOLUTION INTRAVENOUS at 09:20

## 2018-10-25 RX ADMIN — HYDROMORPHONE HYDROCHLORIDE 0.5 MG: 1 INJECTION, SOLUTION INTRAMUSCULAR; INTRAVENOUS; SUBCUTANEOUS at 11:40

## 2018-10-25 RX ADMIN — HYDROMORPHONE HYDROCHLORIDE 0.5 MG: 1 INJECTION, SOLUTION INTRAMUSCULAR; INTRAVENOUS; SUBCUTANEOUS at 11:31

## 2018-10-25 RX ADMIN — HYDROMORPHONE HYDROCHLORIDE 0.5 MG: 2 INJECTION INTRAMUSCULAR; INTRAVENOUS; SUBCUTANEOUS at 10:46

## 2018-10-25 RX ADMIN — PHENYLEPHRINE HYDROCHLORIDE 100 MCG: 10 INJECTION INTRAVENOUS at 10:05

## 2018-10-25 RX ADMIN — SODIUM CHLORIDE, POTASSIUM CHLORIDE, SODIUM LACTATE AND CALCIUM CHLORIDE: 600; 310; 30; 20 INJECTION, SOLUTION INTRAVENOUS at 10:42

## 2018-10-25 RX ADMIN — TRANEXAMIC ACID 1000 MG: 100 INJECTION, SOLUTION INTRAVENOUS at 09:30

## 2018-10-25 RX ADMIN — FENTANYL CITRATE 50 MCG: 50 INJECTION INTRAMUSCULAR; INTRAVENOUS at 09:15

## 2018-10-25 RX ADMIN — HYDROMORPHONE HYDROCHLORIDE 0.5 MG: 1 INJECTION, SOLUTION INTRAMUSCULAR; INTRAVENOUS; SUBCUTANEOUS at 11:21

## 2018-10-25 RX ADMIN — PROPOFOL 130 MG: 10 INJECTION, EMULSION INTRAVENOUS at 09:20

## 2018-10-25 RX ADMIN — EPHEDRINE SULFATE 10 MG: 50 INJECTION INTRAVENOUS at 10:50

## 2018-10-25 RX ADMIN — FENTANYL CITRATE 50 MCG: 50 INJECTION INTRAMUSCULAR; INTRAVENOUS at 09:21

## 2018-10-25 RX ADMIN — ONDANSETRON 4 MG: 2 INJECTION INTRAMUSCULAR; INTRAVENOUS at 09:44

## 2018-10-25 RX ADMIN — DEXAMETHASONE SODIUM PHOSPHATE 10 MG: 4 INJECTION, SOLUTION INTRAMUSCULAR; INTRAVENOUS at 09:44

## 2018-10-25 RX ADMIN — ACETAMINOPHEN 650 MG: 325 TABLET ORAL at 19:31

## 2018-10-25 RX ADMIN — FENTANYL CITRATE 50 MCG: 50 INJECTION INTRAMUSCULAR; INTRAVENOUS at 09:41

## 2018-10-25 RX ADMIN — LIDOCAINE HYDROCHLORIDE 60 MG: 20 INJECTION, SOLUTION EPIDURAL; INFILTRATION; INTRACAUDAL; PERINEURAL at 09:20

## 2018-10-25 RX ADMIN — SODIUM CHLORIDE, POTASSIUM CHLORIDE, SODIUM LACTATE AND CALCIUM CHLORIDE: 600; 310; 30; 20 INJECTION, SOLUTION INTRAVENOUS at 09:45

## 2018-10-25 RX ADMIN — ACETAMINOPHEN 1000 MG: 500 TABLET ORAL at 08:03

## 2018-10-25 RX ADMIN — Medication 2 G: at 09:15

## 2018-10-25 RX ADMIN — BUPIVACAINE HYDROCHLORIDE 50 ML: 2.5 INJECTION, SOLUTION EPIDURAL; INFILTRATION; INTRACAUDAL; PERINEURAL at 08:54

## 2018-10-25 RX ADMIN — SODIUM CHLORIDE, PRESERVATIVE FREE 10 ML: 5 INJECTION INTRAVENOUS at 11:21

## 2018-10-25 RX ADMIN — FENTANYL CITRATE 100 MCG: 50 INJECTION INTRAMUSCULAR; INTRAVENOUS at 08:52

## 2018-10-25 RX ADMIN — HYDROMORPHONE HYDROCHLORIDE 0.5 MG: 2 INJECTION INTRAMUSCULAR; INTRAVENOUS; SUBCUTANEOUS at 10:43

## 2018-10-25 RX ADMIN — Medication: at 15:07

## 2018-10-25 RX ADMIN — Medication 2 G: at 18:11

## 2018-10-25 RX ADMIN — Medication 10 ML: at 21:01

## 2018-10-25 RX ADMIN — PHENYLEPHRINE HYDROCHLORIDE 100 MCG: 10 INJECTION INTRAVENOUS at 09:45

## 2018-10-25 ASSESSMENT — PULMONARY FUNCTION TESTS
PIF_VALUE: 1
PIF_VALUE: 4
PIF_VALUE: 16
PIF_VALUE: 16
PIF_VALUE: 17
PIF_VALUE: 18
PIF_VALUE: 2
PIF_VALUE: 16
PIF_VALUE: 15
PIF_VALUE: 5
PIF_VALUE: 17
PIF_VALUE: 15
PIF_VALUE: 2
PIF_VALUE: 15
PIF_VALUE: 16
PIF_VALUE: 15
PIF_VALUE: 13
PIF_VALUE: 1
PIF_VALUE: 1
PIF_VALUE: 16
PIF_VALUE: 15
PIF_VALUE: 17
PIF_VALUE: 16
PIF_VALUE: 15
PIF_VALUE: 2
PIF_VALUE: 2
PIF_VALUE: 16
PIF_VALUE: 15
PIF_VALUE: 1
PIF_VALUE: 28
PIF_VALUE: 16
PIF_VALUE: 16
PIF_VALUE: 1
PIF_VALUE: 17
PIF_VALUE: 17
PIF_VALUE: 16
PIF_VALUE: 16
PIF_VALUE: 15
PIF_VALUE: 17
PIF_VALUE: 17
PIF_VALUE: 15
PIF_VALUE: 15
PIF_VALUE: 2
PIF_VALUE: 14
PIF_VALUE: 2
PIF_VALUE: 17
PIF_VALUE: 17
PIF_VALUE: 2
PIF_VALUE: 14
PIF_VALUE: 15
PIF_VALUE: 15
PIF_VALUE: 9
PIF_VALUE: 15
PIF_VALUE: 16
PIF_VALUE: 17
PIF_VALUE: 17
PIF_VALUE: 15
PIF_VALUE: 1
PIF_VALUE: 16
PIF_VALUE: 15
PIF_VALUE: 3
PIF_VALUE: 15
PIF_VALUE: 3
PIF_VALUE: 17
PIF_VALUE: 18
PIF_VALUE: 18
PIF_VALUE: 15
PIF_VALUE: 2
PIF_VALUE: 17
PIF_VALUE: 16
PIF_VALUE: 18
PIF_VALUE: 15
PIF_VALUE: 17
PIF_VALUE: 3
PIF_VALUE: 1
PIF_VALUE: 15
PIF_VALUE: 18
PIF_VALUE: 15
PIF_VALUE: 15
PIF_VALUE: 3
PIF_VALUE: 2
PIF_VALUE: 17
PIF_VALUE: 15
PIF_VALUE: 17
PIF_VALUE: 2
PIF_VALUE: 14
PIF_VALUE: 1
PIF_VALUE: 1
PIF_VALUE: 2
PIF_VALUE: 15
PIF_VALUE: 2
PIF_VALUE: 16
PIF_VALUE: 16
PIF_VALUE: 15
PIF_VALUE: 16
PIF_VALUE: 1
PIF_VALUE: 18
PIF_VALUE: 15
PIF_VALUE: 15
PIF_VALUE: 2
PIF_VALUE: 14
PIF_VALUE: 15

## 2018-10-25 ASSESSMENT — PAIN DESCRIPTION - DESCRIPTORS
DESCRIPTORS: ACHING;BURNING
DESCRIPTORS: DULL;RADIATING
DESCRIPTORS: ACHING
DESCRIPTORS: ACHING
DESCRIPTORS: ACHING;BURNING

## 2018-10-25 ASSESSMENT — PAIN DESCRIPTION - LOCATION
LOCATION: HIP

## 2018-10-25 ASSESSMENT — PAIN SCALES - GENERAL
PAINLEVEL_OUTOF10: 6
PAINLEVEL_OUTOF10: 1
PAINLEVEL_OUTOF10: 8
PAINLEVEL_OUTOF10: 9
PAINLEVEL_OUTOF10: 6
PAINLEVEL_OUTOF10: 4
PAINLEVEL_OUTOF10: 0
PAINLEVEL_OUTOF10: 8

## 2018-10-25 ASSESSMENT — PAIN DESCRIPTION - ORIENTATION
ORIENTATION: RIGHT

## 2018-10-25 ASSESSMENT — PAIN DESCRIPTION - PAIN TYPE
TYPE: SURGICAL PAIN

## 2018-10-25 ASSESSMENT — PAIN DESCRIPTION - FREQUENCY
FREQUENCY: CONTINUOUS
FREQUENCY: CONTINUOUS

## 2018-10-25 ASSESSMENT — PAIN - FUNCTIONAL ASSESSMENT: PAIN_FUNCTIONAL_ASSESSMENT: 0-10

## 2018-10-25 ASSESSMENT — LIFESTYLE VARIABLES: SMOKING_STATUS: 0

## 2018-10-25 NOTE — PROGRESS NOTES
Satisfied    Social/Functional History  Social/Functional History  Lives With: Spouse  Type of Home: House  Home Layout: One level, Laundry in basement  Home Access: Stairs to enter without rails  Entrance Stairs - Number of Steps: 1+1+1  Bathroom Shower/Tub: Walk-in shower  Bathroom Toilet: Handicap height  Bathroom Equipment: Grab bars in shower, Hand-held shower  Home Equipment: Cane, Standard walker (Adjustable bed)  ADL Assistance: Independent  Homemaking Assistance: Independent  Homemaking Responsibilities: Yes  Ambulation Assistance: Independent  Transfer Assistance: Independent  Active : Yes  Type of occupation: Teacher       Objective        Orientation  Overall Orientation Status: Within Normal Limits     Balance  Sitting Balance: Supervision  Standing Balance: Contact guard assistance  Standing Balance  Time: 1-2 minutes, 2-3 minutes multiple trials  Activity: mobility, standing ADL   Sit to stand: Contact guard assistance  Stand to sit: Contact guard assistance  Functional Mobility  Functional - Mobility Device: Standard Walker  Activity: To/from bathroom  Assist Level: Contact guard assistance  Toilet Transfers  Toilet - Technique: Ambulating (SW)  Equipment Used: Standard toilet  Toilet Transfer: Contact guard assistance  ADL  Feeding: Setup (for lunch tray)  Grooming: Contact guard assistance (in stance at sink)  LE Dressing: Maximum assistance;Setup (to don socks and brief)  Toileting: Contact guard assistance  Tone RUE  RUE Tone: Normotonic  Tone DENICE GALDAMEZ Tone: Normotonic  Coordination  Movements Are Fluid And Coordinated: Yes     Bed mobility  Supine to Sit: Minimal assistance (HOB elevated, use of bedrail)  Sit to Supine: Unable to assess  Scooting: Contact guard assistance (for to EOB)  Transfers  Sit to stand: Contact guard assistance  Stand to sit: Contact guard assistance     Cognition  Overall Cognitive Status: WFL        Sensation  Overall Sensation Status:  (denies N/T)        DENICE

## 2018-10-25 NOTE — CONSULTS
for input(s): NA, K, CL, CO2, BUN, CREATININE, CALCIUM, PHOS in the last 72 hours. Invalid input(s): MAGNES  No results for input(s): AST, ALT, BILIDIR, BILITOT, ALKPHOS in the last 72 hours. No results for input(s): INR in the last 72 hours. No results for input(s): Cole Gu in the last 72 hours. Urinalysis:    Lab Results   Component Value Date    NITRU Negative 10/16/2018    WBCUA  10/16/2018    BACTERIA 2+ 10/16/2018    RBCUA None seen 10/16/2018    BLOODU Negative 10/16/2018    SPECGRAV 1.015 10/16/2018    GLUCOSEU Negative 10/16/2018         ASSESSMENT:    Active Hospital Problems    Diagnosis Date Noted    Status post total replacement of right hip [Z96.641] 10/25/2018    Status post total hip replacement, right [Z96.641] 10/25/2018     Body mass index is 29.95 kg/m². Admitted for right hip for osteoarthritis status post hip replacement. Recent urinary tract infection, acute renal failure, repeat BMP today showed a normal creatinine, awaiting repeat urinalysis, she is still on Cipro which I don't think she needs to continue any more. DVT Prophylaxis: per primary  Diet: DIET GENERAL;  Code Status: Full Code    PT/OT Eval Status:per primary     Dispo - inpt     Thank you for the consultation, will follow up as needed    Rogerio Hess MD  [place] [phone]

## 2018-10-25 NOTE — PROGRESS NOTES
(): At least 1 percent but less than 20 percent impaired, limited or restricted                            AM-PAC Score     AM-PAC Inpatient Mobility without Stair Climbing Raw Score : 15  AM-PAC Inpatient without Stair Climbing T-Scale Score : 43.03  Mobility Inpatient CMS 0-100% Score: 47.43  Mobility Inpatient without Stair CMS G-Code Modifier : CK       Goals  Short term goals  Time Frame for Short term goals: 5 days  Short term goal 1: Pt will be indep with bed mobility. Short term goal 2: Pt will be supervision for transfers with SW.  Short term goal 3: Pt will be supervision for ambulation 75 ft with SW.  Short term goal 4: Pt will negotiate curb step x 2 reps with SBA and SW. Short term goal 5: 10/26: Pt will be indep with ANNALEE protocol exercises.   Patient Goals   Patient goals : \"to go home\"       Therapy Time   Individual Concurrent Group Co-treatment   Time In 1431         Time Out 1515         Minutes 44         Timed Code Treatment Minutes: [address], [place], DPT #[id number]

## 2018-10-25 NOTE — H&P
I have reviewed the history and physical and examined the patient and find no relevant changes. I have reviewed with the patient and/or family the risks, benefits, and alternatives to the procedure. Patient being given increased dosage/quantity of opoid pain medication in excess of OSMB guidelines which noted a 30 MED daily of opioids due to the fact that he/she has undergone major orthopaedic surgery as outlined in rule 4731-11-13. Dosages and further duration of the pain medication will be re-evaluated at her post op visit in 2 weeks. Patient was educated on dosing expectations and limits of prescribing as a result of the new [facility] rules enacted August 31, 2017. Please also note that this is not the initial opoid prescription issued to this patient but a continuation of medication utilized during the hospital admission as noted in the medical record. [place]ARRS report has also been utilized to screen for any abuse history or suspicious activity as outlined in [place]. All efforts have been taken to prevent abuse potential and misuse of opioid medications including education, screening, and close clinical follow up. Controlled Substances Monitoring:     RX Monitoring 10/25/2018   Attestation The Prescription Monitoring Report for this patient was reviewed today. Documentation No signs of potential drug abuse or diversion identified.

## 2018-10-26 VITALS
DIASTOLIC BLOOD PRESSURE: 68 MMHG | WEIGHT: 180 LBS | HEART RATE: 86 BPM | SYSTOLIC BLOOD PRESSURE: 118 MMHG | HEIGHT: 65 IN | OXYGEN SATURATION: 97 % | BODY MASS INDEX: 29.99 KG/M2 | TEMPERATURE: 97.9 F | RESPIRATION RATE: 15 BRPM

## 2018-10-26 LAB
ANION GAP SERPL CALCULATED.3IONS-SCNC: 12 MMOL/L (ref 3–16)
BUN BLDV-MCNC: 23 MG/DL (ref 7–20)
CALCIUM SERPL-MCNC: 9.1 MG/DL (ref 8.3–10.6)
CHLORIDE BLD-SCNC: 106 MMOL/L (ref 99–110)
CO2: 21 MMOL/L (ref 21–32)
CREAT SERPL-MCNC: 1.1 MG/DL (ref 0.6–1.2)
ESTIMATED AVERAGE GLUCOSE: 65.2 MG/DL
GFR AFRICAN AMERICAN: 58
GFR NON-AFRICAN AMERICAN: 48
GLUCOSE BLD-MCNC: 223 MG/DL (ref 70–99)
HBA1C MFR BLD: 3.9 %
HCT VFR BLD CALC: 27.5 % (ref 36–48)
HEMOGLOBIN: 9.2 G/DL (ref 12–16)
MCH RBC QN AUTO: 31.7 PG (ref 26–34)
MCHC RBC AUTO-ENTMCNC: 33.4 G/DL (ref 31–36)
MCV RBC AUTO: 94.8 FL (ref 80–100)
PDW BLD-RTO: 13.6 % (ref 12.4–15.4)
PLATELET # BLD: 159 K/UL (ref 135–450)
PMV BLD AUTO: 7.4 FL (ref 5–10.5)
POTASSIUM SERPL-SCNC: 4.2 MMOL/L (ref 3.5–5.1)
RBC # BLD: 2.9 M/UL (ref 4–5.2)
SODIUM BLD-SCNC: 139 MMOL/L (ref 136–145)
WBC # BLD: 9.6 K/UL (ref 4–11)

## 2018-10-26 PROCEDURE — 83036 HEMOGLOBIN GLYCOSYLATED A1C: CPT

## 2018-10-26 PROCEDURE — 36415 COLL VENOUS BLD VENIPUNCTURE: CPT

## 2018-10-26 PROCEDURE — 6360000002 HC RX W HCPCS: Performed by: PHYSICIAN ASSISTANT

## 2018-10-26 PROCEDURE — 2580000003 HC RX 258: Performed by: PHYSICIAN ASSISTANT

## 2018-10-26 PROCEDURE — 80048 BASIC METABOLIC PNL TOTAL CA: CPT

## 2018-10-26 PROCEDURE — 6370000000 HC RX 637 (ALT 250 FOR IP): Performed by: PHYSICIAN ASSISTANT

## 2018-10-26 PROCEDURE — 85027 COMPLETE CBC AUTOMATED: CPT

## 2018-10-26 PROCEDURE — 97535 SELF CARE MNGMENT TRAINING: CPT

## 2018-10-26 PROCEDURE — 97110 THERAPEUTIC EXERCISES: CPT

## 2018-10-26 PROCEDURE — APPSS45 APP SPLIT SHARED TIME 31-45 MINUTES: Performed by: PHYSICIAN ASSISTANT

## 2018-10-26 RX ORDER — TRAMADOL HYDROCHLORIDE 50 MG/1
50 TABLET ORAL EVERY 6 HOURS PRN
Qty: 12 TABLET | Refills: 0 | Status: SHIPPED | OUTPATIENT
Start: 2018-10-26 | End: 2018-10-29

## 2018-10-26 RX ADMIN — ACETAMINOPHEN 650 MG: 325 TABLET ORAL at 03:17

## 2018-10-26 RX ADMIN — Medication 10 ML: at 08:48

## 2018-10-26 RX ADMIN — DOCUSATE SODIUM 100 MG: 100 CAPSULE, LIQUID FILLED ORAL at 08:48

## 2018-10-26 RX ADMIN — CIPROFLOXACIN 500 MG: 500 TABLET, FILM COATED ORAL at 08:48

## 2018-10-26 RX ADMIN — CALCIUM 500 MG: 500 TABLET ORAL at 08:48

## 2018-10-26 RX ADMIN — Medication 2 G: at 02:35

## 2018-10-26 RX ADMIN — ACETAMINOPHEN 650 MG: 325 TABLET ORAL at 08:48

## 2018-10-26 RX ADMIN — Medication 1 TABLET: at 08:48

## 2018-10-26 RX ADMIN — APIXABAN 2.5 MG: 2.5 TABLET, FILM COATED ORAL at 08:48

## 2018-10-26 ASSESSMENT — PAIN DESCRIPTION - ORIENTATION
ORIENTATION: RIGHT
ORIENTATION: RIGHT

## 2018-10-26 ASSESSMENT — PAIN SCALES - GENERAL
PAINLEVEL_OUTOF10: 7
PAINLEVEL_OUTOF10: 3
PAINLEVEL_OUTOF10: 3

## 2018-10-26 ASSESSMENT — PAIN DESCRIPTION - PAIN TYPE
TYPE: SURGICAL PAIN
TYPE: SURGICAL PAIN

## 2018-10-26 ASSESSMENT — PAIN DESCRIPTION - LOCATION
LOCATION: HIP
LOCATION: HIP

## 2018-10-26 NOTE — CARE COORDINATION
Meet with patient and family at bedside, discussed role of nurse navigator and gave contact information. Reviewed reasons to call with questions or concerns, importance of icing, incentive spirometer, pain medication, and physical therapy. Will follow up with call to patient in a few days.     Ambrose Arce  Orthopedic Nurse Navigator  Phone number: [phone]

## 2018-10-26 NOTE — PROGRESS NOTES
have been filled through the Meds to [facility] and Outpatient Pharmacy.     Shital Tompkins PA-C

## 2018-10-26 NOTE — PROGRESS NOTES
Physical Therapy  Facility/Department: [facility] C5 - MED SURG/ORTHO  Daily Treatment Note  NAME: Star De La Rosa  : 1940  MRN: [id number]    Date of Service: 10/26/2018    Discharge Recommendations:  24 hour supervision or assist, S Level 2, Home with Home health PT   PT Equipment Recommendations  Equipment Needed: No    Patient Diagnosis(es): The encounter diagnosis was Primary osteoarthritis of right hip.     has a past medical history of Arthritis of left hip; Cancer ([facility] [address] Chronic midline low back pain with left-sided sciatica; Hypoglycemia; and Status post total replacement of left hip.   has a past surgical history that includes [place] tooth extraction (2016); joint replacement (Left, 2017); and pr total hip arthroplasty (Right, 10/25/2018). Restrictions  Restrictions/Precautions  Restrictions/Precautions: Weight Bearing, General Precautions, Fall Risk  Lower Extremity Weight Bearing Restrictions  Right Lower Extremity Weight Bearing: Partial Weight Bearing  Partial Weight Bearing Percentage Or Pounds: 50%  Position Activity Restriction  Other position/activity restrictions: No hip adduction beyond neutral, no external rotation and no hyperextension; Up to bedside evening of surgery, Bathroom privileges with assistance  Subjective   General  Chart Reviewed: Yes  Response To Previous Treatment: Patient with no complaints from previous session.   Family / Caregiver Present: Yes  Referring Practitioner: Dedrick Cranker, PA; Dr. Susy Silva MD  Subjective  Subjective: pt agreeable to therapy 5/10 R hip  General Comment  Comments: Pt resting in bed on approach; RN cleared pt for therapy          Orientation  Orientation  Overall Orientation Status: Within Functional Limits  Objective   Bed mobility  Supine to Sit: Minimal assistance  Sit to Supine: Minimal assistance  Transfers  Sit to Stand: Contact guard assistance;Stand by assistance  Stand to sit: Stand by assistance  Ambulation  Ambulation?: Yes  WB ESTELLA. - met  Short term goal 4: Pt will negotiate curb step x 2 reps with SBA and SW. Short term goal 5: 10/26: Pt will be indep with ANNALEE protocol exercises. -met   Patient Goals   Patient goals : \"to go home\"    Plan    Plan  Times per week: BID 7 days/wk  Times per day: Twice a day  Specific instructions for Next Treatment: progress mobility as tolerated  Current Treatment Recommendations: Strengthening, Gait Training, Patient/Caregiver Education & Training, ROM, Stair training, Balance Training, Functional Mobility Training, Transfer Training, Safety Education & Training, Home Exercise Program, Neuromuscular Re-education, Endurance Training, Positioning  Safety Devices  Type of devices:  All fall risk precautions in place, Call light within reach, Gait belt, Patient at risk for falls, Nurse notified, Left in bed, Bed alarm in place  Restraints  Initially in place: No     Therapy Time   Individual Concurrent Group Co-treatment   Time In 0941         Time Out 1010         Minutes [address] [place], [place]

## 2018-10-26 NOTE — OP NOTE
documented in the records from [address] [place] [address] [address] The  patient has failed nonoperative measures with respect to the  osteoarthritic hip regarding patient's pain and functional  capabilities. There has been a lengthy discussion with the patient regarding the  risks, benefits, and potential complications of the operation as well  as a normal rehabilitative protocol. The patient specifically voiced  understanding to concerns regarding surgical infection, deep vein  thrombosis, dystrophy, arthrofibrosis, delayed rehabilitation,  dislocation of the prosthesis, periprosthetic fracture, neurologic  injury, etc.  We also talked about what would happen if the patient  were to undergo a hip dislocation and potential treatment in that  regard. The patient voiced understanding to all of these concerns and  elected to have the procedure done. All questions were answered. DETAILS OF SURGERY:  The patient was seen in the preanesthesia holding  area by me, and I personally initialed the operative site. Any  further questions were also answered. The patient was then taken to  the operating room where anesthesia was induced and maintained by  anesthesia personnel. This was all documented in their records from  [facility]. After the patient was positioned as described above, the hip was  prepped and draped with ChloraPrep. A longitudinal incision was made through the skin and carried down to  expose the tensor fascia wyatt and proximal iliotibial band. Longitudinal incision was made through these tissues. The Charnley  retractor was placed and the abductor mechanism was easily isolated. Any redundant greater trochanter bursa was removed to specifically  delineate the anatomy of the abductor mechanism. A bayonet release  was performed of the abductors, sparing the tendinous portion  posteriorly. The abductor musculature was then reflected off the  anterior capsule of the hip joint.   Any of internal and external rotation. The permanent femoral component was then placed and gave the same  excellent alignment and stability. Again, platelet gel was utilized  on the prepped surface for the component placement. Everything else was assessed once again after the hip was reduced. There were no signs of impingement. Any redundant osteophyte tissue  had been removed at this point. Overall tissue tension was  appropriate. The wound was then closed in layered fashion with No. 2 Ethibond in  the capsule and abductor mechanism. Each layer was sprayed with  platelet gel. The patient's wound was closed completely and the  patient was placed on abduction pillow postoperatively with the leg  length and leg position appearing to be appropriate. The patient was  then transported to the recovery room uneventfully.         Fallon Loomis MD    D: 10/25/2018 11:59:59       T: 10/25/2018 17:21:40     [email or web address]  Job#: [id number]     Doc#: [id number]    CC:

## 2018-10-26 NOTE — PROGRESS NOTES
assistance  Ambulation  Ambulation?: Yes  WB Status: 50% WB RLE  Ambulation 1  Surface: level tile  Device: Standard Walker  Assistance: Stand by assistance  Quality of Gait: Cues for sequencing, Step to pattern, decreased step length, lacks heel strike and push off on RLE  Distance: 80'  Stairs/Curb  Stairs?: Yes  Stairs  # Steps : 2  Rails: None  Curbs: 6\"  Device: Standard walker  Assistance: Contact guard assistance     Balance  Posture: Good  Sitting - Static: Good  Sitting - Dynamic: Good  Standing - Static: Good  Standing - Dynamic: Good  Exercises  Quad Sets: x 10 BLE  Heelslides: x 10 RLE  Gluteal Sets: x 10 BLE  Knee Short Arc Quad: x 10 RLE  Ankle Pumps: x 10 BLE      Strength RLE  Comment: Demonstrates fair quad strength; hip limited by pain  Strength LLE  Strength LLE: WFL                 Assessment   Body structures, Functions, Activity limitations: Decreased functional mobility ; Decreased endurance;Decreased balance;Decreased ROM; Decreased strength  Assessment: Increased ambulation distance and able to complete a curb step x 2. Pt educated on exercises and precautions. Able to follow with min assist for transfers . Pt safe for DC when medically cleared but will continue to follow while IP to progress strength and independence. Treatment Diagnosis: decreased (I) with mobility  Specific instructions for Next Treatment: progress mobility as tolerated  Prognosis: Good  Patient Education: Role of PT, safety with mobility, POC, ANNALEE exercises, 50% WB RLE, d/c recs; pt verbalized understanding  REQUIRES PT FOLLOW UP: Yes  Activity Tolerance  Activity Tolerance: Patient Tolerated treatment well     G-Code     OutComes Score                                                    AM-PAC Score             Goals  Short term goals  Time Frame for Short term goals: 5 days  Short term goal 1: Pt will be indep with bed mobility.   Short term goal 2: Pt will be supervision for transfers with SW.  Short term goal 3: Pt will be supervision for ambulation 75 ft with SW. - met  Short term goal 4: Pt will negotiate curb step x 2 reps with SBA and SW. Short term goal 5: 10/26: Pt will be indep with ANNALEE protocol exercises. -met   Patient Goals   Patient goals : \"to go home\"    Plan    Plan  Times per week: BID 7 days/wk  Times per day: Twice a day  Specific instructions for Next Treatment: progress mobility as tolerated  Current Treatment Recommendations: Strengthening, Gait Training, Patient/Caregiver Education & Training, ROM, Stair training, Balance Training, Functional Mobility Training, Transfer Training, Safety Education & Training, Home Exercise Program, Neuromuscular Re-education, Endurance Training, Positioning  Safety Devices  Type of devices:  All fall risk precautions in place, Call light within reach, Gait belt, Patient at risk for falls, Nurse notified, Left in bed, Bed alarm in place  Restraints  Initially in place: No     Therapy Time   Individual Concurrent Group Co-treatment   Time In 0941         Time Out 1010         Minutes [place], [place]

## 2018-10-26 NOTE — DISCHARGE SUMMARY
Department of Orthopedic Surgery  Physician Assistant   Discharge Summary      The Rosa Pickett is a 70 y.o. female underwent total hip replacement procedure without complication. Rosa Pickett was admitted to the floor following their recovery in the PACU. Discharge Diagnosis  right Hip Replacement    Current Inpatient Medications    Current Facility-Administered Medications: cyclobenzaprine (FLEXERIL) tablet 10 mg, 10 mg, Oral, TID PRN  calcium elemental (OSCAL) tablet 500 mg, 500 mg, Oral, Daily  ciprofloxacin (CIPRO) tablet 500 mg, 500 mg, Oral, BID  therapeutic multivitamin-minerals 1 tablet, 1 tablet, Oral, Daily  0.9 % sodium chloride infusion, , Intravenous, Continuous  sodium chloride flush 0.9 % injection 10 mL, 10 mL, Intravenous, 2 times per day  sodium chloride flush 0.9 % injection 10 mL, 10 mL, Intravenous, PRN  acetaminophen (TYLENOL) tablet 650 mg, 650 mg, Oral, Q4H PRN  morphine injection 2 mg, 2 mg, Intravenous, Q2H PRN **OR** morphine (PF) injection 4 mg, 4 mg, Intravenous, Q2H PRN  docusate sodium (COLACE) capsule 100 mg, 100 mg, Oral, BID  ondansetron (ZOFRAN) injection 4 mg, 4 mg, Intravenous, Q6H PRN  apixaban (ELIQUIS) tablet 2.5 mg, 2.5 mg, Oral, BID  oxyCODONE (ROXICODONE) immediate release tablet 5 mg, 5 mg, Oral, Q4H PRN **OR** oxyCODONE (ROXICODONE) immediate release tablet 10 mg, 10 mg, Oral, Q4H PRN    Post-operatively the patients diet was advanced as tolerated and their incision was checked on POD #1. The incision is dressing in place, clean, dry and intact with no signs of infection. The patient remained neurovascularly intact in the lower extremity and had intact pulses distally. Patients calf remained soft and showed no evidence of DVT. The patient was able to move their leg and ankle/foot without any problems post-operatively. Physical therapy and occupational therapy were consulted and began working with the patient post-operatively.   The patient progressed with PT/OT as would be expected and continued to improve through their stay. The patients pain was initially controlled with IV medications but we were able to transition to oral pain medications soon after arrival to the floor and their pain remained under good control through their hospital stay. From a medical standpoint the patient remained stable and continued to have the medicine team follow throughout their stay. The patients dressing was changed/incision was checked on day of d/c. The patient will be discharged at this time to Home  with their current diet restrictions and will continue to follow the hip precautions outlined to them by us and PT/OT. Condition on Discharge: Stable    Plan  Return visit in 2 weeks. .  Patient was instructed on the use of pain medications, the signs and symptoms of infection, and was given our number to call should they have any questions or concerns following discharge.

## 2018-10-29 ENCOUNTER — TELEPHONE (OUTPATIENT)
Dept: PHARMACY | Facility: CLINIC | Age: 78
End: 2018-10-29

## 2018-10-29 ENCOUNTER — TELEPHONE (OUTPATIENT)
Dept: ORTHOPEDIC SURGERY | Age: 78
End: 2018-10-29

## 2018-10-29 DIAGNOSIS — Z96.641 STATUS POST TOTAL HIP REPLACEMENT, RIGHT: Primary | ICD-10-CM

## 2018-10-29 PROCEDURE — 1111F DSCHRG MED/CURRENT MED MERGE: CPT

## 2018-10-29 RX ORDER — ACETAMINOPHEN 325 MG/1
650 TABLET ORAL EVERY 6 HOURS PRN
COMMUNITY
End: 2019-06-12

## 2018-10-29 RX ORDER — AMOXICILLIN 250 MG
1 CAPSULE ORAL DAILY PRN
COMMUNITY
End: 2019-06-12

## 2018-10-29 RX ORDER — CIPROFLOXACIN 500 MG/1
500 TABLET, FILM COATED ORAL 2 TIMES DAILY
COMMUNITY
Start: 2018-10-29 | End: 2018-10-31

## 2018-11-06 ENCOUNTER — TELEPHONE (OUTPATIENT)
Dept: ORTHOPEDIC SURGERY | Age: 78
End: 2018-11-06

## 2018-11-06 NOTE — TELEPHONE ENCOUNTER
Spoke with pt doing well. Incision status: No drainage or redness    Edema/Swelling/Teds: Swelling seems to be going down, icing occasionally. Pain level and status: Very little. Use of pain medications: Not using anything    Blood thinner: Eliquis- no issues. Bowels: Diarrhea yesterday, getting back on track. Home Care Agency active: RN and PT coming out to see pt. [facility], going well.      Outpatient therapy: NA    Do you have all of your medications: Yes    Changes in medications: No    Ortho Vitals: NA    Follow up appointments:    Future Appointments  Date Time Provider Cesilia Medeiros   11/9/2018 11:00 AM MD JOAO Molina AND [facility]   6/12/2019 10:00 AM Mary Lewis MD [facility] AND [facility]

## 2018-11-09 ENCOUNTER — OFFICE VISIT (OUTPATIENT)
Dept: ORTHOPEDIC SURGERY | Age: 78
End: 2018-11-09

## 2018-11-09 VITALS — WEIGHT: 179.9 LBS | BODY MASS INDEX: 29.97 KG/M2 | HEIGHT: 65 IN

## 2018-11-09 DIAGNOSIS — M25.551 RIGHT HIP PAIN: Primary | ICD-10-CM

## 2018-11-09 DIAGNOSIS — Z96.641 STATUS POST RIGHT HIP REPLACEMENT: ICD-10-CM

## 2018-11-09 PROCEDURE — 99024 POSTOP FOLLOW-UP VISIT: CPT | Performed by: ORTHOPAEDIC SURGERY

## 2018-11-20 ENCOUNTER — TELEPHONE (OUTPATIENT)
Dept: ORTHOPEDIC SURGERY | Age: 78
End: 2018-11-20

## 2018-11-20 NOTE — TELEPHONE ENCOUNTER
Nurse Navigator called patient for one final follow up:  Doing well, just finished with [facility]. Doing fine. Pt has no questions or concerns. Signed off from pt. Instructed pt to call RN or [facility] office with any issues or concerns.     Ambrose Arce  Orthopedic Nurse Navigator  Phone number: [phone]

## 2018-12-03 ENCOUNTER — HOSPITAL ENCOUNTER (OUTPATIENT)
Dept: PHYSICAL THERAPY | Age: 78
Setting detail: THERAPIES SERIES
Discharge: HOME | End: 2018-12-03
Payer: MEDICARE

## 2018-12-03 PROCEDURE — G8978 MOBILITY CURRENT STATUS: HCPCS

## 2018-12-03 PROCEDURE — G8979 MOBILITY GOAL STATUS: HCPCS

## 2018-12-03 PROCEDURE — 97112 NEUROMUSCULAR REEDUCATION: CPT

## 2018-12-03 PROCEDURE — 97110 THERAPEUTIC EXERCISES: CPT

## 2018-12-03 PROCEDURE — 97161 PT EVAL LOW COMPLEX 20 MIN: CPT

## 2018-12-03 NOTE — PLAN OF CARE
Activities:  Squatting/Sit to Stand, Walking, Stairs    Type: []Constant   [x]Intermittent  []Radiating [x]Localized []other:     Paresthesias: None reported     Occupation/School: Retired    Living Status/Prior Level of Function: Independent with ADLs and IADLs, Daily Stretches, 10 steps to basement      OBJECTIVE:     ROM LEFT RIGHT   HIP Flex WFL 75 w. Heel slide   HIP Abd     HIP add NT NT   HIP Ext     HIP IR     HIP ER     Knee ext     Knee Flex     Ankle PF     Ankle DF     Ankle In     Ankle Ev     Strength  LEFT RIGHT   HIP Flexors Grossly 5/5 all planes 3-/5   HIP Abductors  3+/5   HIP add  4+/5   HIP Ext  NT   Hip ER  NT   Hip IR  NT   Knee EXT (quad)  4+/5   Knee Flex (HS)  5-/5   Ankle DF  5/5   Ankle PF  5/5   Ankle Inv     Ankle EV          Circumference  MP  SP         Reflexes/Sensation:    [x]Dermatomes/Myotomes intact    [x]Reflexes equal and normal bilaterally   []Other:    Joint mobility: s/p ANNALEE (apparent tightness of surrounding tissues) NT   []Normal    []Hypo   []Hyper    Palpation: Firm tissue at anterior hip (scar tissue), acquired muscle tightness    Functional Mobility/Transfers: STS from standard height requires significant BUE, mod I transfers to and from [facility] with supporting of RLE    Posture: Forward head, B anterior shoulders, slight R knee bend-> WB preference to L    Bandages/Dressings/Incisions: Incision with good closure and healing, adhesion/tightness apparent     Gait: (include devices/WB status) Stiff leg gait (decreased right hip/knee motion), No device used this date    Orthopedic Special Tests: No sign /symptoms of DVT                       [x] Patient history, allergies, meds reviewed. Medical chart reviewed. See intake form. Review Of Systems (ROS):  [x]Performed Review of systems (Integumentary, CardioPulmonary, Neurological) by intake and observation. Intake form has been scanned into medical record.  Patient has been instructed to contact their primary care []Signs/symptoms consistent with tendinitis/tendinosis    []signs/symptoms consistent with pathology which may benefit from Dry needling      []other:      Prognosis/Rehab Potential:      []Excellent   [x]Good    []Fair   []Poor    Tolerance of evaluation/treatment:    []Excellent   [x]Good    []Fair    []Poor    Physical Therapy Evaluation Complexity Justification  [x] A history of present problem with:  [] no personal factors and/or comorbidities that impact the plan of care;  [x]1-2 personal factors and/or comorbidities that impact the plan of care  []3 personal factors and/or comorbidities that impact the plan of care  [x] An examination of body systems using standardized tests and measures addressing any of the following: body structures and functions (impairments), activity limitations, and/or participation restrictions;:  [] a total of 1-2 or more elements   [x] a total of 3 or more elements   [] a total of 4 or more elements   [x] A clinical presentation with:  [x] stable and/or uncomplicated characteristics   [] evolving clinical presentation with changing characteristics  [] unstable and unpredictable characteristics;   [x] Clinical decision making of [x] low, [] moderate, [] high complexity using standardized patient assessment instrument and/or measurable assessment of functional outcome. [x] EVAL (LOW) 81223 (typically 20 minutes face-to-face)  [] EVAL (MOD) 35521 (typically 30 minutes face-to-face)  [] EVAL (HIGH) 44957 (typically 45 minutes face-to-face)  [] RE-EVAL     PLAN  Frequency/Duration:  1-2 days per week for 8 Weeks:  Interventions:  [x]  Therapeutic exercise including: strength training, ROM, for Lower extremity and core   [x]  NMR activation and proprioception for LE, Glutes and Core   [x]  Manual therapy as indicated for LE, Hip and spine to include: Dry Needling/IASTM, STM, PROM, Gr I-IV mobilizations, manipulation.    [x] Modalities as needed that may include: thermal agents, E-stim, Biofeedback, US, iontophoresis as indicated  [x] Patient education on joint protection, postural re-education, activity modification, progression of HEP. HEP instruction: Reviewed HEP and pt given handout. GOALS:  Patient stated goal: to gain strength and mobility    Therapist goals for Patient:   Short Term Goals: To be achieved in: 2 weeks  1. Independent in HEP and progression per patient tolerance, in order to prevent re-injury. 2. Patient will have a decrease in pain to facilitate improvement in movement, function, and ADLs as indicated by Functional Deficits. Long Term Goals: To be achieved in: 8 weeks  1. Disability index score of 40% or less for the LEFS to assist with reaching prior level of function. 2. Patient will demonstrate increased AROM to =LLE to allow for proper joint functioning as indicated by patients Functional Deficits. 3. Patient will demonstrate an increase in Strength to good proximal hip strength and control >/=4+/5 allow for proper functional mobility as indicated by patients Functional Deficits. 4. Patient will return to all selfcare and household functional activities without increased symptoms or restriction.    5. Pt will be able to perform STS transfer without use of UE from standard height chair (patient specific functional goal)       Electronically signed by:  Vignesh Arias PT ,DPT [id number]

## 2018-12-07 ENCOUNTER — HOSPITAL ENCOUNTER (OUTPATIENT)
Dept: PHYSICAL THERAPY | Age: 78
Setting detail: THERAPIES SERIES
Discharge: HOME | End: 2018-12-07
Payer: MEDICARE

## 2018-12-07 ENCOUNTER — OFFICE VISIT (OUTPATIENT)
Dept: ORTHOPEDIC SURGERY | Age: 78
End: 2018-12-07

## 2018-12-07 VITALS — HEIGHT: 65 IN | WEIGHT: 179.9 LBS | BODY MASS INDEX: 29.97 KG/M2

## 2018-12-07 DIAGNOSIS — Z96.641 STATUS POST RIGHT HIP REPLACEMENT: ICD-10-CM

## 2018-12-07 DIAGNOSIS — Z96.641 STATUS POST TOTAL REPLACEMENT OF RIGHT HIP: Primary | ICD-10-CM

## 2018-12-07 PROCEDURE — 99024 POSTOP FOLLOW-UP VISIT: CPT | Performed by: ORTHOPAEDIC SURGERY

## 2018-12-07 PROCEDURE — 97112 NEUROMUSCULAR REEDUCATION: CPT

## 2018-12-07 PROCEDURE — 97140 MANUAL THERAPY 1/> REGIONS: CPT

## 2018-12-07 PROCEDURE — 97110 THERAPEUTIC EXERCISES: CPT

## 2018-12-10 ENCOUNTER — HOSPITAL ENCOUNTER (OUTPATIENT)
Dept: PHYSICAL THERAPY | Age: 78
Setting detail: THERAPIES SERIES
Discharge: HOME | End: 2018-12-10
Payer: MEDICARE

## 2018-12-10 PROCEDURE — 97140 MANUAL THERAPY 1/> REGIONS: CPT

## 2018-12-10 PROCEDURE — 97112 NEUROMUSCULAR REEDUCATION: CPT

## 2018-12-10 PROCEDURE — 97110 THERAPEUTIC EXERCISES: CPT

## 2018-12-10 NOTE — FLOWSHEET NOTE
[facility], [address] [place] [address]  Phone: [phone]  Fax [phone]    Physical Therapy Daily Treatment Note  Date:  12/10/2018    Patient Name:  Mary Jane Rodrigues    :  1940  MRN: [id number]  Restrictions/Precautions:    Physician Information:  Referring Practitioner: Dr. Romel Pathak  Medical/Treatment Diagnosis Information:  Diagnosis: M25.551 (ICD-10-CM) - Right hip pain; Z96.641 (ICD-10-CM) - Status post right hip replacement  · Treatment Diagnosis: M25.551 (ICD-10-CM) - Right hip pain  [] Conservative / [x] Surgical - DOS: 10/25/18  Therapy Diagnosis/Practice Pattern:  Practice Pattern H: Joint Arthroplasty  Insurance/Certification information:  PT Insurance Information: [facility]-50/25-150Ded-Met-96/4-$0CP-PT/OT-Med Nec  Plan of care signed: [x] YES  [] NO  Number of Comorbidities:  []0     [x]1-2    []3+  Date of Patient follow up with Physician: 1 year    G-Code (if applicable):      Date G-Code Applied:  12/3/18       Progress Note: []  Yes  [x]  No  Next due by: Visit #10        Latex Allergy:  [x]NO      []YES  Preferred Language for Healthcare:   [x]English       []other:    Visit # Insurance Allowable Reporting Period   3 BMN Begin Date: 12/3/2018               End Date:      RECERT DUE BY: Week 8    SUBJECTIVE:  Pt reports no issues after her last session. She has no pain at this time. Pt is 6-7 weeks post-op at this time.     OBJECTIVE: See eval  Observation: Slight antalgic features to gait (decreased hip flex/ext motions through swing/stance)  Palpation:Continued restrictions in hip mobility; scar adhesion     Test used Initial score Current Score   Pain Summary VAS 0/10 0/10   Functional questionnaire LEFS 58%    ROM Hip flexion 75 w/ heel slide 80 w/ heel slide    extension     Strength quad      ABD      flexion          RESTRICTIONS/PRECAUTIONS: Post-op ANNALEE Protocol  Orthovitals (Week 8): 12/20/18    Exercises/Interventions:     Therapeutic Ex Sets/reps Notes   Seated G/HS stretch :30x3 HEP   PPT :5x10 HEP   Heel slide :5x10 HEP   Standing marches x20 1#B; HEP   Standing HS curls x20 1#B; HEP   Mini squats x20 HEP   Standing hip flex, abd 2x20ea 1#B; HEP   STS x10 HEP  1x Airex; No UE   Rocking on Bike  6'    Incline S 2x30\"    Standing HR x20                        Pt Education: Pt educated on POC and HEP. 5' Reviewed safe mobility and stretching within hip precaution.  -Pt demonstrated understanding   Manual Intervention     Scar massage 2' Pt educated for home   STM to hip flexors (contract relax), quad, HS  Gentle PROM as appropriate 8' Pt guarding improved with contract/relax (hip flex)   Manual HSS :30x2                   NMR re-education     Gait training/ 10ft x2  Heel IC, clearance, hip/knee motion pattern   Step up 4inch x10ea Ant/Lat                                          Therapeutic Exercise and NMR EXR  [x] (14481) Provided verbal/tactile cueing for activities related to strengthening, flexibility, endurance, ROM for improvements in LE, proximal hip, and core control with self care, mobility, lifting, ambulation. [x] (92035) Provided verbal/tactile cueing for activities related to improving balance, coordination, kinesthetic sense, posture, motor skill, proprioception  to assist with LE, proximal hip, and core control in self care, mobility, lifting, ambulation and eccentric single leg control.      NMR and Therapeutic Activities:    [x] (88891 or 75633) Provided verbal/tactile cueing for activities related to improving balance, coordination, kinesthetic sense, posture, motor skill, proprioception and motor activation to allow for proper function of core, proximal hip and LE with self care and ADLs  [] (94490) Gait Re-education- Provided training and instruction to the patient for proper LE, core and proximal hip recruitment and positioning and eccentric body weight control with less for the LEFS to assist with reaching prior level of function. 2. Patient will demonstrate increased AROM to =LLE to allow for proper joint functioning as indicated by patients Functional Deficits. 3. Patient will demonstrate an increase in Strength to good proximal hip strength and control >/=4+/5 allow for proper functional mobility as indicated by patients Functional Deficits. 4. Patient will return to all selfcare and household functional activities without increased symptoms or restriction. 5. Pt will be able to perform STS transfer without use of UE from standard height chair (patient specific functional goal)       New or Updated Goals (if applicable):  [x] No change to goals established upon initial eval/last progress note:  New Goals:    Progression Towards Functional goals:   [x] Patient is progressing as expected towards functional goals listed. [] Progression is slowed due to complexities listed. [] Progression has been slowed due to co-morbidities. [] Plan just implemented, too soon to assess goals progression  [] Other:     ASSESSMENT:    [] Improvement noted relative to goals:  [] No Improvement noted related to goals:  Summary/Patient's response to treatment: Pt with good tolerance of above exercises and treatment. Slight improvement in functional mobility of the hip observed this date. Able to perform STS at lower surface without issue. Pt continues to be somewhat guarded with safe hip mobility activities (Pt education as above). Will continue to progress as appropriate.     Treatment/Activity Tolerance:  [x] Patient tolerated treatment well [] Patient limited by fatique   [] Patient limited by pain  [] Patient limited by other medical complications  [] Other:     Prognosis: [x] Good [] Fair  [] Poor    Patient Requires Follow-up: [x] Yes  [] No    PLAN: See eval  [x] Continue per plan of care [] Alter current plan (see comments)  [] Plan of care initiated [] Hold pending MD visit []

## 2018-12-14 ENCOUNTER — HOSPITAL ENCOUNTER (OUTPATIENT)
Dept: PHYSICAL THERAPY | Age: 78
Setting detail: THERAPIES SERIES
Discharge: HOME | End: 2018-12-14
Payer: MEDICARE

## 2018-12-14 PROCEDURE — 97140 MANUAL THERAPY 1/> REGIONS: CPT

## 2018-12-14 PROCEDURE — 97110 THERAPEUTIC EXERCISES: CPT

## 2018-12-14 PROCEDURE — 97112 NEUROMUSCULAR REEDUCATION: CPT

## 2018-12-14 NOTE — FLOWSHEET NOTE
[facility], [address] [place] [address] Kei  Phone: [phone]  Fax [phone]    Physical Therapy Daily Treatment Note  Date:  2018    Patient Name:  Cher Sullivan    :  1940  MRN: [id number]  Restrictions/Precautions:    Physician Information:  Referring Practitioner: Dr. Osvaldo Velasquez  Medical/Treatment Diagnosis Information:  Diagnosis: M25.551 (ICD-10-CM) - Right hip pain; Z96.641 (ICD-10-CM) - Status post right hip replacement  · Treatment Diagnosis: M25.551 (ICD-10-CM) - Right hip pain  [] Conservative / [x] Surgical - DOS: 10/25/18  Therapy Diagnosis/Practice Pattern:  Practice Pattern H: Joint Arthroplasty  Insurance/Certification information:  PT Insurance Information: [facility]-50/25-150Ded-Met-96/4-$0CP-PT/OT-Med Nec  Plan of care signed: [x] YES  [] NO  Number of Comorbidities:  []0     [x]1-2    []3+  Date of Patient follow up with Physician: 1 year    G-Code (if applicable):      Date G-Code Applied:  12/3/18       Progress Note: []  Yes  [x]  No  Next due by: Visit #10        Latex Allergy:  [x]NO      []YES  Preferred Language for Healthcare:   [x]English       []other:    Visit # Insurance Allowable Reporting Period   4 BMN Begin Date: 12/3/2018               End Date:      RECERT DUE BY: Week 8    SUBJECTIVE:  Pt reports she has been able to do more activities (i.e. Shopping, chores) and hasn't had any issues. Pt is 7 weeks post-op at this time.      OBJECTIVE: See eval  Observation: Slight antalgic features to gait (decreased hip flex/ext motions through swing/stance)  Palpation:Continued restrictions in hip mobility; scar adhesion     Test used Initial score Current Score   Pain Summary VAS 0/10 0/10   Functional questionnaire LEFS 58%    ROM Hip flexion 75 w/ heel slide 85 w/ heel slide    extension     Strength quad      ABD      flexion       RESTRICTIONS/PRECAUTIONS: Post-op ANNALEE Protocol  Orthovitals and proximal hip recruitment and positioning and eccentric body weight control with ambulation re-education including up and down stairs     Home Exercise Program:    [x] (11563) Reviewed/Progressed HEP activities related to strengthening, flexibility, endurance, ROM of core, proximal hip and LE for functional self-care, mobility, lifting and ambulation/stair navigation   [] (82022)Reviewed/Progressed HEP activities related to improving balance, coordination, kinesthetic sense, posture, motor skill, proprioception of core, proximal hip and LE for self care, mobility, lifting, and ambulation/stair navigation      Manual Treatments:  PROM / STM / Oscillations-Mobs:  G-I, II, III, IV (PA's, Inf., Post.)  [x] (10674) Provided manual therapy to mobilize LE, proximal hip and/or LS spine soft tissue/joints for the purpose of modulating pain, promoting relaxation,  increasing ROM, reducing/eliminating soft tissue swelling/inflammation/restriction, improving soft tissue extensibility and allowing for proper ROM for normal function with self care, mobility, lifting and ambulation. Modalities:  CP x5'    Charges:  Timed Code Treatment Minutes: 55   Total Treatment Minutes: 60     [] EVAL (LOW) 41562 (typically 20 minutes face-to-face)  [] EVAL (MOD) 14387 (typically 30 minutes face-to-face)  [] EVAL (HIGH) 72895 (typically 45 minutes face-to-face)  [] RE-EVAL     [x] MV(16988) x  2   [] IONTO  [x] NMR (41746) x  1   [] VASO  [x] Manual (68745) x  1    [] Other:  [] TA x       [] Mech Traction (36900)  [] ES(attended) (24154)      [] ES (un) (24572):     GOALS:  Patient stated goal: to gain strength and mobility    Therapist goals for Patient:   Short Term Goals: To be achieved in: 2 weeks  1. Independent in HEP and progression per patient tolerance, in order to prevent re-injury. -met  2. Patient will have a decrease in pain to facilitate improvement in movement, function, and ADLs as indicated by Functional Deficits. -met    Long Term Goals: To be achieved in: 8 weeks  1. Disability index score of 40% or less for the LEFS to assist with reaching prior level of function. 2. Patient will demonstrate increased AROM to =LLE to allow for proper joint functioning as indicated by patients Functional Deficits. 3. Patient will demonstrate an increase in Strength to good proximal hip strength and control >/=4+/5 allow for proper functional mobility as indicated by patients Functional Deficits. 4. Patient will return to all selfcare and household functional activities without increased symptoms or restriction. 5. Pt will be able to perform STS transfer without use of UE from standard height chair (patient specific functional goal)       New or Updated Goals (if applicable):  [x] No change to goals established upon initial eval/last progress note:  New Goals:    Progression Towards Functional goals:   [x] Patient is progressing as expected towards functional goals listed. [] Progression is slowed due to complexities listed. [] Progression has been slowed due to co-morbidities. [] Plan just implemented, too soon to assess goals progression  [] Other:     ASSESSMENT:    [] Improvement noted relative to goals:  [] No Improvement noted related to goals:  Summary/Patient's response to treatment: Pt with good tolerance of above exercises and additions. Good challenge with SLS activities on R (obstacle step-over, SLS balance, march tap). Slight improvements in safe hip ROM, less guarding this date. Will continue to progress as appropriate.     Treatment/Activity Tolerance:  [x] Patient tolerated treatment well [] Patient limited by fatique   [] Patient limited by pain  [] Patient limited by other medical complications  [] Other:     Prognosis: [x] Good [] Fair  [] Poor    Patient Requires Follow-up: [x] Yes  [] No    PLAN: See eval  [x] Continue per plan of care [] Alter current plan (see comments)  [] Plan of care initiated [] Hold

## 2018-12-17 ENCOUNTER — HOSPITAL ENCOUNTER (OUTPATIENT)
Dept: PHYSICAL THERAPY | Age: 78
Setting detail: THERAPIES SERIES
Discharge: HOME | End: 2018-12-17
Payer: MEDICARE

## 2018-12-17 PROCEDURE — 97140 MANUAL THERAPY 1/> REGIONS: CPT

## 2018-12-17 PROCEDURE — 97112 NEUROMUSCULAR REEDUCATION: CPT

## 2018-12-17 PROCEDURE — 97110 THERAPEUTIC EXERCISES: CPT

## 2018-12-21 ENCOUNTER — HOSPITAL ENCOUNTER (OUTPATIENT)
Dept: PHYSICAL THERAPY | Age: 78
Setting detail: THERAPIES SERIES
Discharge: HOME | End: 2018-12-21
Payer: MEDICARE

## 2018-12-21 PROCEDURE — 97140 MANUAL THERAPY 1/> REGIONS: CPT

## 2018-12-21 PROCEDURE — 97110 THERAPEUTIC EXERCISES: CPT

## 2018-12-21 PROCEDURE — 97112 NEUROMUSCULAR REEDUCATION: CPT

## 2018-12-21 NOTE — FLOWSHEET NOTE
Tab)    Exercises/Interventions:     Therapeutic Ex Sets/reps Notes   Seated G/HS stretch :30x3 HEP   PPT  HEP   Heel slide :5x10 HEP   Standing marches  2#B; HEP   Standing march tap  [facility]   Standing HS curls  2#B; HEP   Mini squats  HEP   Standing hip flex, abd  2#B; HEP   STS x10 HEP  1x Airex; No UE   Rocking on Bike  5'    Incline S 2x30\"    Standing HR x20    HL hip squeeze :10x10 HEP   Bridge x10 HEP   Leg Press Safe Zone  70#DL >90 only   Machine Knee ext  x20 10#DL             Orthovitals Assessment (6MWT, etc 15' Discussed results (see media tab)        Pt Education: Pt educated on POC and HEP. 2'   -Pt demonstrated understanding   Manual Intervention     Scar massage 2'    STM to hip flexors, quad, HS  Gentle PROM as appropriate 8'    Manual HSS :30x2                   NMR re-education     Stepping at bar x10ea Clearance over half foam   Step up 6inch x10ea Ant/Lat   Semi-tandem balance :10x2ea At bar   SLS balance :10x2ea At bar; HEP                                Therapeutic Exercise and NMR EXR  [x] (01893) Provided verbal/tactile cueing for activities related to strengthening, flexibility, endurance, ROM for improvements in LE, proximal hip, and core control with self care, mobility, lifting, ambulation. [x] (81228) Provided verbal/tactile cueing for activities related to improving balance, coordination, kinesthetic sense, posture, motor skill, proprioception  to assist with LE, proximal hip, and core control in self care, mobility, lifting, ambulation and eccentric single leg control.      NMR and Therapeutic Activities:    [x] (36877 or 03824) Provided verbal/tactile cueing for activities related to improving balance, coordination, kinesthetic sense, posture, motor skill, proprioception and motor activation to allow for proper function of core, proximal hip and LE with self care and ADLs  [] (34948) Gait Re-education- Provided training and instruction to the patient for proper LE, core and proximal

## 2018-12-28 ENCOUNTER — HOSPITAL ENCOUNTER (OUTPATIENT)
Dept: PHYSICAL THERAPY | Age: 78
Setting detail: THERAPIES SERIES
Discharge: HOME | End: 2018-12-28
Payer: MEDICARE

## 2018-12-28 PROCEDURE — 97110 THERAPEUTIC EXERCISES: CPT

## 2018-12-28 PROCEDURE — 97140 MANUAL THERAPY 1/> REGIONS: CPT

## 2018-12-28 PROCEDURE — 97112 NEUROMUSCULAR REEDUCATION: CPT

## 2018-12-28 NOTE — FLOWSHEET NOTE
care [] Alter current plan (see comments)  [] Plan of care initiated [] Hold pending MD visit [] Discharge    Electronically signed by: Vignesh Arias, [address] [facility] ,DPT [id number]

## 2019-01-04 ENCOUNTER — HOSPITAL ENCOUNTER (OUTPATIENT)
Dept: PHYSICAL THERAPY | Age: 79
Setting detail: THERAPIES SERIES
Discharge: HOME | End: 2019-01-04
Payer: MEDICARE

## 2019-01-04 PROCEDURE — 97140 MANUAL THERAPY 1/> REGIONS: CPT

## 2019-01-04 PROCEDURE — 97112 NEUROMUSCULAR REEDUCATION: CPT

## 2019-01-04 PROCEDURE — 97110 THERAPEUTIC EXERCISES: CPT

## 2019-01-07 ENCOUNTER — HOSPITAL ENCOUNTER (OUTPATIENT)
Dept: PHYSICAL THERAPY | Age: 79
Setting detail: THERAPIES SERIES
Discharge: HOME | End: 2019-01-07
Payer: MEDICARE

## 2019-01-07 PROCEDURE — 97112 NEUROMUSCULAR REEDUCATION: CPT

## 2019-01-07 PROCEDURE — 97140 MANUAL THERAPY 1/> REGIONS: CPT

## 2019-01-07 PROCEDURE — 97110 THERAPEUTIC EXERCISES: CPT

## 2019-01-11 ENCOUNTER — HOSPITAL ENCOUNTER (OUTPATIENT)
Dept: PHYSICAL THERAPY | Age: 79
Setting detail: THERAPIES SERIES
Discharge: HOME | End: 2019-01-11
Payer: MEDICARE

## 2019-01-11 PROCEDURE — G8979 MOBILITY GOAL STATUS: HCPCS

## 2019-01-11 PROCEDURE — G8980 MOBILITY D/C STATUS: HCPCS

## 2019-01-11 PROCEDURE — 97140 MANUAL THERAPY 1/> REGIONS: CPT

## 2019-01-11 PROCEDURE — 97112 NEUROMUSCULAR REEDUCATION: CPT

## 2019-01-11 PROCEDURE — G8978 MOBILITY CURRENT STATUS: HCPCS

## 2019-01-11 PROCEDURE — 97110 THERAPEUTIC EXERCISES: CPT

## 2019-06-12 ENCOUNTER — OFFICE VISIT (OUTPATIENT)
Dept: INTERNAL MEDICINE CLINIC | Age: 79
End: 2019-06-12
Payer: MEDICARE

## 2019-06-12 VITALS
BODY MASS INDEX: 27.82 KG/M2 | OXYGEN SATURATION: 99 % | SYSTOLIC BLOOD PRESSURE: 112 MMHG | HEART RATE: 65 BPM | HEIGHT: 65 IN | DIASTOLIC BLOOD PRESSURE: 62 MMHG | WEIGHT: 167 LBS

## 2019-06-12 DIAGNOSIS — Z00.00 ROUTINE GENERAL MEDICAL EXAMINATION AT A HEALTH CARE FACILITY: Primary | ICD-10-CM

## 2019-06-12 PROCEDURE — G0439 PPPS, SUBSEQ VISIT: HCPCS | Performed by: INTERNAL MEDICINE

## 2019-06-12 ASSESSMENT — ANXIETY QUESTIONNAIRES: GAD7 TOTAL SCORE: 2

## 2019-06-12 ASSESSMENT — LIFESTYLE VARIABLES: HOW OFTEN DO YOU HAVE A DRINK CONTAINING ALCOHOL: 0

## 2019-06-12 ASSESSMENT — PATIENT HEALTH QUESTIONNAIRE - PHQ9
SUM OF ALL RESPONSES TO PHQ QUESTIONS 1-9: 0
SUM OF ALL RESPONSES TO PHQ QUESTIONS 1-9: 0

## 2019-06-12 NOTE — PATIENT INSTRUCTIONS
Personalized Preventive Plan for Galindo Lindsey - 6/12/2019  Medicare offers a range of preventive health benefits. Some of the tests and screenings are paid in full while other may be subject to a deductible, co-insurance, and/or copay. Some of these benefits include a comprehensive review of your medical history including lifestyle, illnesses that may run in your family, and various assessments and screenings as appropriate. After reviewing your medical record and screening and assessments performed today your provider may have ordered immunizations, labs, imaging, and/or referrals for you. A list of these orders (if applicable) as well as your Preventive Care list are included within your After Visit Summary for your review. Other Preventive Recommendations:    · A preventive eye exam performed by an eye specialist is recommended every 1-2 years to screen for glaucoma; cataracts, macular degeneration, and other eye disorders. · A preventive dental visit is recommended every 6 months. · Try to get at least 150 minutes of exercise per week or 10,000 steps per day on a pedometer . · Order or download the FREE \"Exercise & Physical Activity: Your Everyday Guide\" from [employer]. Call 3-739.585.9165 or search [employer] online. · You need 5345-1767 mg of calcium and 5041-2281 IU of vitamin D per day. It is possible to meet your calcium requirement with diet alone, but a vitamin D supplement is usually necessary to meet this goal.  · When exposed to the sun, use a sunscreen that protects against both UVA and UVB radiation with an SPF of 30 or greater. Reapply every 2 to 3 hours or after sweating, drying off with a towel, or swimming. · Always wear a seat belt when traveling in a car. Always wear a helmet when riding a bicycle or motorcycle.

## 2019-06-12 NOTE — PROGRESS NOTES
Medicare Annual Wellness Visit  Name: Rizwan Valdes Date: 2019   MRN: [id number] Sex: Female   Age: 70 y.o. Ethnicity: Non-/Non    : 1940 Race: Jocelynn Dimas is here for Medicare AWV    Screenings for behavioral, psychosocial and functional/safety risks, and cognitive dysfunction are all negative except as indicated below. These results, as well as other patient data from the [address] form, are documented in Flowsheets linked to this Encounter. Allergies   Allergen Reactions    Celebrex [Celecoxib]      Leg cramps    Sulfa Antibiotics Hives and Diarrhea       Prior to Visit Medications    Medication Sig Taking?  Authorizing Provider   Multiple Vitamins-Minerals (THERAPEUTIC MULTIVITAMIN-MINERALS) tablet Take 1 tablet by mouth daily Yes Historical Provider, MD   Cyanocobalamin (B-12) 100 MCG TABS Take 1 tablet by mouth daily  Yes Historical Provider, MD   calcium carbonate (OSCAL) 500 MG TABS tablet Take 500 mg by mouth daily Yes Historical Provider, MD     Past Medical History:   Diagnosis Date    Arthritis of left hip     Cancer ([facility] [address] 2014    basal cell removed from nose    Chronic midline low back pain with left-sided sciatica     Hypoglycemia     Status post total replacement of left hip 2017     Past Surgical History:   Procedure Laterality Date    JOINT REPLACEMENT Left 2017    LEFT TOTAL HIP REPLACEMENT WITH APPLICATION OF PLATELET RICH    [place] TOTAL HIP ARTHROPLASTY Right 10/25/2018    RIGHT TOTAL HIP REPLACEMENT WITH CELLSAVER AND PLATELET GEL               SMTIH & NEPHEW [facility]ilo [facility] at [facility] 2016     Family History   Problem Relation Age of Onset    Dementia Mother     Cancer Sister         Lung    Cancer Brother         Prostate    Arthritis Brother     Diabetes Paternal Aunt     Diabetes Paternal Grandmother     Cancer Brother         Lung    Cancer Brother Esophagus       CareTeam (Including outside providers/suppliers regularly involved in providing care):   Patient Care Team:  Danielle Diaz MD as PCP - General (Internal Medicine)  Ronald Sparrow MD as PCP - [facility]    Wt Readings from Last 3 Encounters:   06/12/19 167 lb (75.8 kg)   12/07/18 179 lb 14.3 oz (81.6 kg)   11/09/18 179 lb 14.3 oz (81.6 kg)     Vitals:    06/12/19 0957   BP: 112/62   Pulse: 65   SpO2: 99%   Weight: 167 lb (75.8 kg)   Height: 5' 5\" (1.651 m)     Body mass index is 27.79 kg/m². Based upon direct observation of the patient, evaluation of cognition reveals recent and remote memory intact. Patient's complete Health Risk Assessment and screening values have been reviewed and are found in Flowsheets. The following problems were reviewed today and where indicated follow up appointments were made and/or referrals ordered. Positive Risk Factor Screenings with Interventions:     General Health:  General  In general, how would you say your health is?: Very Good  In the past 7 days, have you experienced any of the following?  New or Increased Pain, New or Increased Fatigue, Loneliness, Social Isolation, Stress or Anger?: None of These  Do you get the social and emotional support that you need?: Yes  Do you have a Living Will?: (!) No  General Health Risk Interventions:  · No Living Will: Full Code    Personalized Preventive Plan   Current Health Maintenance Status  Immunization History   Administered Date(s) Administered    Influenza, High Dose (Fluzone 65 yrs and older) 09/09/2016, 10/09/2018    Pneumococcal 13-valent Conjugate ([id number]) 05/24/2016    Pneumococcal Polysaccharide ([id number]) 06/08/2017        Health Maintenance   Topic Date Due    DTaP/Tdap/Td vaccine (1 - Tdap) 05/20/1961    Shingles Vaccine (1 of 2) 05/20/1992    DEXA (modify frequency per FRAX score)  Completed    Flu vaccine  Completed    Pneumococcal 65+ years Vaccine  Completed

## 2019-12-05 ENCOUNTER — OFFICE VISIT (OUTPATIENT)
Dept: ORTHOPEDIC SURGERY | Age: 79
End: 2019-12-05
Payer: MEDICARE

## 2019-12-05 VITALS — BODY MASS INDEX: 27.84 KG/M2 | HEIGHT: 65 IN | WEIGHT: 167.11 LBS

## 2019-12-05 DIAGNOSIS — M25.551 RIGHT HIP PAIN: Primary | ICD-10-CM

## 2019-12-05 PROCEDURE — 99212 OFFICE O/P EST SF 10 MIN: CPT | Performed by: PHYSICIAN ASSISTANT

## 2020-06-16 ENCOUNTER — VIRTUAL VISIT (OUTPATIENT)
Dept: INTERNAL MEDICINE CLINIC | Age: 80
End: 2020-06-16
Payer: MEDICARE

## 2020-06-16 PROCEDURE — 99442 PR PHYS/QHP TELEPHONE EVALUATION 11-20 MIN: CPT | Performed by: INTERNAL MEDICINE

## 2020-06-16 PROCEDURE — G0439 PPPS, SUBSEQ VISIT: HCPCS | Performed by: INTERNAL MEDICINE

## 2020-06-16 ASSESSMENT — LIFESTYLE VARIABLES: HOW OFTEN DO YOU HAVE A DRINK CONTAINING ALCOHOL: 0

## 2020-06-16 ASSESSMENT — PATIENT HEALTH QUESTIONNAIRE - PHQ9
SUM OF ALL RESPONSES TO PHQ QUESTIONS 1-9: 0
SUM OF ALL RESPONSES TO PHQ QUESTIONS 1-9: 0

## 2020-06-16 NOTE — PROGRESS NOTES
Date of Service:  6/16/2020    Chief Complaint:      Chief Complaint   Patient presents with    Medicare AWV    Arthritis       HPI:  Chanda Padron is a 68 y.o. Chanda Padron is a 68 y.o. female evaluated via telephone on 6/16/2020 due to [place] outbreak. Consent:  She and/or health care decision maker is aware that that she may receive a bill for this telephone service, depending on her insurance coverage, and has provided verbal consent to proceed: I AFFIRM/DO this is a Patient Initiated Episode with an Established Patient who has not had a related appointment within my department in the past 7 days or scheduled within the next 24 hours. Anemia:  She has always been anemic and she does eat meat and has a well balance diet.     Hyperglycemia:  She denies eating much sweets or carbohydrates    Total Time: minutes: 11-20 minutes      Lab Results   Component Value Date    LABA1C 3.9 10/26/2018    LABA1C 3.7 10/16/2018    LABA1C 3.8 07/02/2017    LABMICR Not Indicated 10/25/2018     Lab Results   Component Value Date     10/26/2018    K 4.2 10/26/2018     10/26/2018    CO2 21 10/26/2018    BUN 23 (H) 10/26/2018    CREATININE 1.1 10/26/2018    GLUCOSE 223 (H) 10/26/2018    CALCIUM 9.1 10/26/2018     Lab Results   Component Value Date    CHOL 177 06/07/2016    TRIG 133 06/07/2016    HDL 51 06/07/2016    LDLCALC 99 06/07/2016     Lab Results   Component Value Date    ALT 11 07/01/2017    AST 15 07/01/2017     No results found for: TSH, T4FREE  Lab Results   Component Value Date    WBC 9.6 10/26/2018    HGB 9.2 (L) 10/26/2018    HCT 27.5 (L) 10/26/2018    MCV 94.8 10/26/2018     10/26/2018     Lab Results   Component Value Date    INR 0.97 10/16/2018    INR 1.03 01/18/2017      No results found for: PSA   No results found for: [facility]     Patient Active Problem List   Diagnosis    Chronic midline low back pain with left-sided sciatica    Breast density    Hypoglycemia    Anemia    Arthritis of right hip    Status post total replacement of right hip    Status post total hip replacement, right       Allergies   Allergen Reactions    Celebrex [Celecoxib]      Leg cramps    Sulfa Antibiotics Hives and Diarrhea     Outpatient Medications Marked as Taking for the 6/16/20 encounter (Virtual Visit) with Pal Ragsdale MD   Medication Sig Dispense Refill    Multiple Vitamins-Minerals (THERAPEUTIC MULTIVITAMIN-MINERALS) tablet Take 1 tablet by mouth daily      Cyanocobalamin (B-12) 100 MCG TABS Take 1 tablet by mouth daily       calcium carbonate (OSCAL) 500 MG TABS tablet Take 500 mg by mouth daily           Review of Systems: 14 systems were negative except of what was stated on HPI    Nursing note and vitals reviewed. There were no vitals filed for this visit. Wt Readings from Last 3 Encounters:   12/05/19 167 lb 1.7 oz (75.8 kg)   06/12/19 167 lb (75.8 kg)   12/07/18 179 lb 14.3 oz (81.6 kg)     BP Readings from Last 3 Encounters:   06/12/19 112/62   10/26/18 118/68   10/25/18 126/85     There is no height or weight on file to calculate BMI. Constitutional: Patient sounded well and in no distress. Psychiatric: Normal mood on the phone. Assessment/Plan:  Juan Slater was seen today for medicare awv and arthritis. Diagnoses and all orders for this visit:    Routine general medical examination at a health care facility    Screening for lipid disorders  -     Lipid Panel; Future    Other iron deficiency anemia  -     CBC with Differential; Future    Hyperglycemia  -     Comprehensive Metabolic Panel; Future  -     Lipid Panel; Future  -     Hemoglobin A1C; Future        Return for Medicare Annual Wellness Visit in 1 year.

## 2020-06-16 NOTE — PROGRESS NOTES
Medicare Annual Wellness Visit  Are Name: Bernadine Acuna Date: 2020   MRN: <[id number]> Sex: Female   Age: 68 y.o. Ethnicity: Non-/Non    : 1940 Race: Thea Liu is here for Medicare AWV and Arthritis    Screenings for behavioral, psychosocial and functional/safety risks, and cognitive dysfunction are all negative except as indicated below. These results, as well as other patient data from the [address] form, are documented in Flowsheets linked to this Encounter. Allergies   Allergen Reactions    Celebrex [Celecoxib]      Leg cramps    Sulfa Antibiotics Hives and Diarrhea       Prior to Visit Medications    Medication Sig Taking?  Authorizing Provider   Multiple Vitamins-Minerals (THERAPEUTIC MULTIVITAMIN-MINERALS) tablet Take 1 tablet by mouth daily Yes Historical Provider, MD   Cyanocobalamin (B-12) 100 MCG TABS Take 1 tablet by mouth daily  Yes Historical Provider, MD   calcium carbonate (OSCAL) 500 MG TABS tablet Take 500 mg by mouth daily Yes Historical Provider, MD       Past Medical History:   Diagnosis Date    Arthritis of left hip     Cancer ([facility] [address] 2014    basal cell removed from nose    Chronic midline low back pain with left-sided sciatica     Hypoglycemia     Status post total replacement of left hip 2017       Past Surgical History:   Procedure Laterality Date    JOINT REPLACEMENT Left 2017    LEFT TOTAL HIP REPLACEMENT WITH APPLICATION OF PLATELET RICH    [place] TOTAL HIP ARTHROPLASTY Right 10/25/2018    RIGHT TOTAL HIP REPLACEMENT WITH CELLSAVER AND PLATELET GEL               SMTIH & NEPHEW performed by Chava Mujica MD at [facility] 2016       Family History   Problem Relation Age of Onset    Dementia Mother     Cancer Sister         Lung    Cancer Brother         Prostate    Arthritis Brother     Diabetes Paternal Aunt     Diabetes Paternal Grandmother     Cancer Brother Lung    Cancer Brother         Esophagus       CareTeam (Including outside providers/suppliers regularly involved in providing care):   Patient Care Team:  Tyler Sparrow MD as PCP - General (Internal Medicine)  Tyler Sparrow MD as PCP - [facility] Empaneled Provider    Wt Readings from Last 3 Encounters:   12/05/19 167 lb 1.7 oz (75.8 kg)   06/12/19 167 lb (75.8 kg)   12/07/18 179 lb 14.3 oz (81.6 kg)     There were no vitals filed for this visit. There is no height or weight on file to calculate BMI. Based upon direct observation of the patient, evaluation of cognition reveals recent and remote memory intact. Patient's complete Health Risk Assessment and screening values have been reviewed and are found in Flowsheets. The following problems were reviewed today and where indicated follow up appointments were made and/or referrals ordered. Positive Risk Factor Screenings with Interventions:     General Health:  General  In general, how would you say your health is?: Good  In the past 7 days, have you experienced any of the following? New or Increased Pain, New or Increased Fatigue, Loneliness, Social Isolation, Stress or Anger?: None of These  Do you get the social and emotional support that you need?: Yes  Do you have a Living Will?: (!) No(working on getting one set up )  General Health Risk Interventions:  · No Living Will: Full Code    Health Habits/Nutrition:  Health Habits/Nutrition  Do you exercise for at least 20 minutes 2-3 times per week?: Yes  Have you lost any weight without trying in the past 3 months?: No  Do you eat fewer than 2 meals per day?: No  Have you seen a dentist within the past year?: (!) No(needs to schedule put off due to COVID )  There is no height or weight on file to calculate BMI.   Health Habits/Nutrition Interventions:  ·     Hearing/Vision:  No exam data present  Hearing/Vision  Have you had an eye exam within the past year?: (!) No(needs to schedule- put off due to COVID)  Hearing/Vision Interventions:  · Vision concerns:  patient encouraged to make appointment with his/her eye specialist    Personalized Preventive Plan   Current Health Maintenance Status  Immunization History   Administered Date(s) Administered    Influenza, High Dose (Fluzone 65 yrs and older) 09/09/2016, 10/09/2018    Pneumococcal Conjugate 13-valent ([id number]) 05/24/2016    Pneumococcal Polysaccharide ([id number]) 06/08/2017        Health Maintenance   Topic Date Due    DTaP/Tdap/Td vaccine (1 - Tdap) 05/20/1961    Shingles Vaccine (1 of 2) 05/20/1992    Annual Wellness Visit (AWV)  06/12/2019    Flu vaccine (Season Ended) 09/01/2020    DEXA (modify frequency per FRAX score)  Completed    Pneumococcal 65+ years Vaccine  Completed    Hepatitis A vaccine  Aged Out    Hepatitis B vaccine  Aged Out    Hib vaccine  Aged Out    Meningococcal (ACWY) vaccine  Aged Out     Recommendations for [employer] Due: see orders and patient instructions/AVS.  . Recommended screening schedule for the next 5-10 years is provided to the patient in written form: see Patient Instructions/AVS.    Trinidad Madsen was seen today for medicare awv and arthritis. Diagnoses and all orders for this visit:    Routine general medical examination at a health care facility          Scott Harding is a 68 y.o. female being evaluated by a Virtual Visit (phone) encounter to address concerns as mentioned above. A caregiver was present when appropriate. Due to this being a TeleHealth encounter (During MKUZX-55 public health emergency), evaluation of the following organ systems was limited: Vitals/Constitutional/EENT/Resp/CV/GI//MS/Neuro/Skin/Heme-Lymph-Imm.   Pursuant to the emergency declaration under the [facility] [address] [place] [address] waiver authority and the [employer] Act, this Virtual Visit was conducted with patient's (and/or legal guardian's) consent, to reduce the patient's risk of exposure to COVID-19 and provide necessary medical care. The patient (and/or legal guardian) has also been advised to contact this office for worsening conditions or problems, and seek emergency medical treatment and/or call 911 if deemed necessary. Patient identification was verified at the start of the visit: Yes    Services were provided through phone to substitute for in-person clinic visit. Patient and provider were located at their individual homes. --ANITA BARAHONA MD on 6/16/2020 at 1:02 PM    An electronic signature was used to authenticate this note.

## 2020-06-16 NOTE — PATIENT INSTRUCTIONS
Personalized Preventive Plan for Henok Gandara - 6/16/2020  Medicare offers a range of preventive health benefits. Some of the tests and screenings are paid in full while other may be subject to a deductible, co-insurance, and/or copay. Some of these benefits include a comprehensive review of your medical history including lifestyle, illnesses that may run in your family, and various assessments and screenings as appropriate. After reviewing your medical record and screening and assessments performed today your provider may have ordered immunizations, labs, imaging, and/or referrals for you. A list of these orders (if applicable) as well as your Preventive Care list are included within your After Visit Summary for your review. Other Preventive Recommendations:    · A preventive eye exam performed by an eye specialist is recommended every 1-2 years to screen for glaucoma; cataracts, macular degeneration, and other eye disorders. · A preventive dental visit is recommended every 6 months. · Try to get at least 150 minutes of exercise per week or 10,000 steps per day on a pedometer . · Order or download the FREE \"Exercise & Physical Activity: Your Everyday Guide\" from [employer]. Call 8-945.508.8420 or search [employer] online. · You need 3891-1111 mg of calcium and 7398-9937 IU of vitamin D per day. It is possible to meet your calcium requirement with diet alone, but a vitamin D supplement is usually necessary to meet this goal.  · When exposed to the sun, use a sunscreen that protects against both UVA and UVB radiation with an SPF of 30 or greater. Reapply every 2 to 3 hours or after sweating, drying off with a towel, or swimming. · Always wear a seat belt when traveling in a car. Always wear a helmet when riding a bicycle or motorcycle.

## 2020-07-01 ENCOUNTER — HOSPITAL ENCOUNTER (OUTPATIENT)
Age: 80
Discharge: HOME | End: 2020-07-01
Payer: MEDICARE

## 2020-07-01 LAB
A/G RATIO: 1.7 (ref 1.1–2.2)
ALBUMIN SERPL-MCNC: 4.7 G/DL (ref 3.4–5)
ALP BLD-CCNC: 80 U/L (ref 40–129)
ALT SERPL-CCNC: 16 U/L (ref 10–40)
ANION GAP SERPL CALCULATED.3IONS-SCNC: 13 MMOL/L (ref 3–16)
AST SERPL-CCNC: 17 U/L (ref 15–37)
BASOPHILS ABSOLUTE: 0.1 K/UL (ref 0–0.2)
BASOPHILS RELATIVE PERCENT: 1.6 %
BILIRUB SERPL-MCNC: 1 MG/DL (ref 0–1)
BUN BLDV-MCNC: 36 MG/DL (ref 7–20)
CALCIUM SERPL-MCNC: 10.3 MG/DL (ref 8.3–10.6)
CHLORIDE BLD-SCNC: 107 MMOL/L (ref 99–110)
CHOLESTEROL, TOTAL: 177 MG/DL (ref 0–199)
CO2: 19 MMOL/L (ref 21–32)
CREAT SERPL-MCNC: 1.3 MG/DL (ref 0.6–1.2)
EOSINOPHILS ABSOLUTE: 0.5 K/UL (ref 0–0.6)
EOSINOPHILS RELATIVE PERCENT: 12.2 %
GFR AFRICAN AMERICAN: 48
GFR NON-AFRICAN AMERICAN: 40
GLOBULIN: 2.8 G/DL
GLUCOSE BLD-MCNC: 78 MG/DL (ref 70–99)
HCT VFR BLD CALC: 31.3 % (ref 36–48)
HDLC SERPL-MCNC: 43 MG/DL (ref 40–60)
HEMOGLOBIN: 10.5 G/DL (ref 12–16)
LDL CHOLESTEROL CALCULATED: 100 MG/DL
LYMPHOCYTES ABSOLUTE: 0.7 K/UL (ref 1–5.1)
LYMPHOCYTES RELATIVE PERCENT: 19 %
MCH RBC QN AUTO: 33.9 PG (ref 26–34)
MCHC RBC AUTO-ENTMCNC: 33.6 G/DL (ref 31–36)
MCV RBC AUTO: 100.7 FL (ref 80–100)
MONOCYTES ABSOLUTE: 0.2 K/UL (ref 0–1.3)
MONOCYTES RELATIVE PERCENT: 6.2 %
NEUTROPHILS ABSOLUTE: 2.3 K/UL (ref 1.7–7.7)
NEUTROPHILS RELATIVE PERCENT: 61 %
PDW BLD-RTO: 13.8 % (ref 12.4–15.4)
PLATELET # BLD: 189 K/UL (ref 135–450)
PMV BLD AUTO: 8.1 FL (ref 5–10.5)
POTASSIUM SERPL-SCNC: 4.4 MMOL/L (ref 3.5–5.1)
RBC # BLD: 3.11 M/UL (ref 4–5.2)
SODIUM BLD-SCNC: 139 MMOL/L (ref 136–145)
TOTAL PROTEIN: 7.5 G/DL (ref 6.4–8.2)
TRIGL SERPL-MCNC: 171 MG/DL (ref 0–150)
VLDLC SERPL CALC-MCNC: 34 MG/DL
WBC # BLD: 3.7 K/UL (ref 4–11)

## 2020-07-01 PROCEDURE — 36415 COLL VENOUS BLD VENIPUNCTURE: CPT

## 2020-07-01 PROCEDURE — 85025 COMPLETE CBC W/AUTO DIFF WBC: CPT

## 2020-07-01 PROCEDURE — 80053 COMPREHEN METABOLIC PANEL: CPT

## 2020-07-01 PROCEDURE — 80061 LIPID PANEL: CPT

## 2020-07-01 PROCEDURE — 83036 HEMOGLOBIN GLYCOSYLATED A1C: CPT

## 2020-07-02 LAB
ESTIMATED AVERAGE GLUCOSE: 53.7 MG/DL
HBA1C MFR BLD: <3.5 %

## 2021-01-29 ENCOUNTER — IMMUNIZATION (OUTPATIENT)
Dept: PRIMARY CARE CLINIC | Age: 81
End: 2021-01-29
Payer: MEDICARE

## 2021-01-29 PROCEDURE — 0011A COVID-19, MODERNA VACCINE 100MCG/0.5ML DOSE: CPT | Performed by: FAMILY MEDICINE

## 2021-01-29 PROCEDURE — 91301 COVID-19, MODERNA VACCINE 100MCG/0.5ML DOSE: CPT | Performed by: FAMILY MEDICINE

## 2021-02-26 ENCOUNTER — IMMUNIZATION (OUTPATIENT)
Dept: PRIMARY CARE CLINIC | Age: 81
End: 2021-02-26
Payer: MEDICARE

## 2021-02-26 PROCEDURE — 91301 COVID-19, MODERNA VACCINE 100MCG/0.5ML DOSE: CPT | Performed by: FAMILY MEDICINE

## 2021-02-26 PROCEDURE — 0012A COVID-19, MODERNA VACCINE 100MCG/0.5ML DOSE: CPT | Performed by: FAMILY MEDICINE

## 2021-08-23 ASSESSMENT — LIFESTYLE VARIABLES
AUDIT TOTAL SCORE: INCOMPLETE
AUDIT-C TOTAL SCORE: INCOMPLETE
HOW OFTEN DO YOU HAVE A DRINK CONTAINING ALCOHOL: 0
HOW OFTEN DO YOU HAVE A DRINK CONTAINING ALCOHOL: NEVER

## 2021-08-23 ASSESSMENT — PATIENT HEALTH QUESTIONNAIRE - PHQ9
1. LITTLE INTEREST OR PLEASURE IN DOING THINGS: 0
SUM OF ALL RESPONSES TO PHQ QUESTIONS 1-9: 0
SUM OF ALL RESPONSES TO PHQ QUESTIONS 1-9: 0
2. FEELING DOWN, DEPRESSED OR HOPELESS: 0
SUM OF ALL RESPONSES TO PHQ QUESTIONS 1-9: 0
SUM OF ALL RESPONSES TO PHQ9 QUESTIONS 1 & 2: 0

## 2021-08-30 ENCOUNTER — OFFICE VISIT (OUTPATIENT)
Dept: INTERNAL MEDICINE CLINIC | Age: 81
End: 2021-08-30
Payer: MEDICARE

## 2021-08-30 VITALS
WEIGHT: 176 LBS | OXYGEN SATURATION: 98 % | DIASTOLIC BLOOD PRESSURE: 80 MMHG | SYSTOLIC BLOOD PRESSURE: 128 MMHG | HEIGHT: 65 IN | HEART RATE: 65 BPM | BODY MASS INDEX: 29.32 KG/M2

## 2021-08-30 DIAGNOSIS — N28.9 RENAL INSUFFICIENCY: ICD-10-CM

## 2021-08-30 DIAGNOSIS — Z00.00 ROUTINE GENERAL MEDICAL EXAMINATION AT A HEALTH CARE FACILITY: Primary | ICD-10-CM

## 2021-08-30 DIAGNOSIS — D50.8 OTHER IRON DEFICIENCY ANEMIA: ICD-10-CM

## 2021-08-30 LAB
ALBUMIN SERPL-MCNC: 4.4 G/DL (ref 3.4–5)
ANION GAP SERPL CALCULATED.3IONS-SCNC: 14 MMOL/L (ref 3–16)
BASOPHILS ABSOLUTE: 0.1 K/UL (ref 0–0.2)
BASOPHILS RELATIVE PERCENT: 1.2 %
BUN BLDV-MCNC: 26 MG/DL (ref 7–20)
CALCIUM SERPL-MCNC: 10 MG/DL (ref 8.3–10.6)
CHLORIDE BLD-SCNC: 105 MMOL/L (ref 99–110)
CO2: 22 MMOL/L (ref 21–32)
CREAT SERPL-MCNC: 1.5 MG/DL (ref 0.6–1.2)
EOSINOPHILS ABSOLUTE: 0.6 K/UL (ref 0–0.6)
EOSINOPHILS RELATIVE PERCENT: 9.8 %
GFR AFRICAN AMERICAN: 41
GFR NON-AFRICAN AMERICAN: 33
GLUCOSE BLD-MCNC: 95 MG/DL (ref 70–99)
HCT VFR BLD CALC: 28.6 % (ref 36–48)
HEMOGLOBIN: 9.6 G/DL (ref 12–16)
LYMPHOCYTES ABSOLUTE: 0.7 K/UL (ref 1–5.1)
LYMPHOCYTES RELATIVE PERCENT: 11.7 %
MCH RBC QN AUTO: 33.6 PG (ref 26–34)
MCHC RBC AUTO-ENTMCNC: 33.6 G/DL (ref 31–36)
MCV RBC AUTO: 100 FL (ref 80–100)
MONOCYTES ABSOLUTE: 0.3 K/UL (ref 0–1.3)
MONOCYTES RELATIVE PERCENT: 5 %
NEUTROPHILS ABSOLUTE: 4.3 K/UL (ref 1.7–7.7)
NEUTROPHILS RELATIVE PERCENT: 72.3 %
PDW BLD-RTO: 13.9 % (ref 12.4–15.4)
PHOSPHORUS: 3.8 MG/DL (ref 2.5–4.9)
PLATELET # BLD: 149 K/UL (ref 135–450)
PMV BLD AUTO: 7.8 FL (ref 5–10.5)
POTASSIUM SERPL-SCNC: 4.6 MMOL/L (ref 3.5–5.1)
RBC # BLD: 2.86 M/UL (ref 4–5.2)
SODIUM BLD-SCNC: 141 MMOL/L (ref 136–145)
WBC # BLD: 5.9 K/UL (ref 4–11)

## 2021-08-30 PROCEDURE — 36415 COLL VENOUS BLD VENIPUNCTURE: CPT | Performed by: INTERNAL MEDICINE

## 2021-08-30 PROCEDURE — 99212 OFFICE O/P EST SF 10 MIN: CPT | Performed by: INTERNAL MEDICINE

## 2021-08-30 PROCEDURE — G0439 PPPS, SUBSEQ VISIT: HCPCS | Performed by: INTERNAL MEDICINE

## 2021-08-30 SDOH — ECONOMIC STABILITY: FOOD INSECURITY: WITHIN THE PAST 12 MONTHS, YOU WORRIED THAT YOUR FOOD WOULD RUN OUT BEFORE YOU GOT MONEY TO BUY MORE.: NEVER TRUE

## 2021-08-30 SDOH — ECONOMIC STABILITY: FOOD INSECURITY: WITHIN THE PAST 12 MONTHS, THE FOOD YOU BOUGHT JUST DIDN'T LAST AND YOU DIDN'T HAVE MONEY TO GET MORE.: NEVER TRUE

## 2021-08-30 ASSESSMENT — SOCIAL DETERMINANTS OF HEALTH (SDOH): HOW HARD IS IT FOR YOU TO PAY FOR THE VERY BASICS LIKE FOOD, HOUSING, MEDICAL CARE, AND HEATING?: NOT HARD AT ALL

## 2021-08-30 NOTE — PROGRESS NOTES
Jayson Khan  YOB: 1940    Date of Service:  8/30/2021    Chief Complaint:      Chief Complaint   Patient presents with    Medicare AWV       Assessment/Plan:  Natasha Fowler was seen today for medicare awv. Diagnoses and all orders for this visit:    Routine general medical examination at a health care facility  -     Full code    Renal insufficiency  -     RENAL FUNCTION PANEL    Other iron deficiency anemia  -     CBC Auto Differential      Stable and continue on current medications. Return Medicare Wellness in 1 year (20 mins). HPI:  Jayson Khan is a 80 y.o. She's been taking Aleve qam preventively for arthritis since Tyl not work as well. No urinary issues. She does eat a well balance diet including meat and she does take a multivitamin daily.     Lab Results   Component Value Date    LABA1C <3.5 07/01/2020    LABA1C 3.9 10/26/2018    LABA1C 3.7 10/16/2018    LABMICR Not Indicated 10/25/2018     Lab Results   Component Value Date     07/01/2020    K 4.4 07/01/2020     07/01/2020    CO2 19 (L) 07/01/2020    BUN 36 (H) 07/01/2020    CREATININE 1.3 (H) 07/01/2020    GLUCOSE 78 07/01/2020    CALCIUM 10.3 07/01/2020     Lab Results   Component Value Date    CHOL 177 07/01/2020    TRIG 171 07/01/2020    HDL 43 07/01/2020    LDLCALC 100 07/01/2020     Lab Results   Component Value Date    ALT 16 07/01/2020    AST 17 07/01/2020     No results found for: TSH, T4FREE  Lab Results   Component Value Date    WBC 3.7 (L) 07/01/2020    HGB 10.5 (L) 07/01/2020    HCT 31.3 (L) 07/01/2020    .7 (H) 07/01/2020     07/01/2020     Lab Results   Component Value Date    INR 0.97 10/16/2018    INR 1.03 01/18/2017      No results found for: PSA   No results found for: LABURIC     Patient Active Problem List   Diagnosis    Chronic midline low back pain with left-sided sciatica    Breast density    Hypoglycemia    Anemia    Arthritis of right hip    Status post total

## 2021-08-30 NOTE — PROGRESS NOTES
Medicare Annual Wellness Visit  Name: Arun Arechiga Date: 2021   MRN: <[id number]> Sex: Female   Age: 80 y.o. Ethnicity: Non- / Non    : 1940 Race: White (non-)      Leon Martinez is here for Medicare AWV    Screenings for behavioral, psychosocial and functional/safety risks, and cognitive dysfunction are all negative except as indicated below. These results, as well as other patient data from the [address] form, are documented in Flowsheets linked to this Encounter. Allergies   Allergen Reactions    Celebrex [Celecoxib]      Leg cramps    Sulfa Antibiotics Hives and Diarrhea       Prior to Visit Medications    Medication Sig Taking?  Authorizing Provider   Multiple Vitamins-Minerals (THERAPEUTIC MULTIVITAMIN-MINERALS) tablet Take 1 tablet by mouth daily Yes Historical Provider, MD   Cyanocobalamin (B-12) 100 MCG TABS Take 1 tablet by mouth daily  Yes Historical Provider, MD   calcium carbonate (OSCAL) 500 MG TABS tablet Take 500 mg by mouth daily Yes Historical Provider, MD       Past Medical History:   Diagnosis Date    Arthritis of left hip     Cancer ([facility] [address] 2014    basal cell removed from nose    Chronic midline low back pain with left-sided sciatica     Hypoglycemia     Status post total replacement of left hip 2017       Past Surgical History:   Procedure Laterality Date    JOINT REPLACEMENT Left 2017    LEFT TOTAL HIP REPLACEMENT WITH APPLICATION OF PLATELET RICH    [place] TOTAL HIP ARTHROPLASTY Right 10/25/2018    RIGHT TOTAL HIP REPLACEMENT WITH CELLSAVER AND PLATELET GEL               SMTIH & NEPHEW performed by Cely Olivo MD at [address]  2016       Family History   Problem Relation Age of Onset    Dementia Mother     Cancer Sister         Lung    Cancer Brother         Prostate    Arthritis Brother     Diabetes Paternal Aunt     Diabetes Paternal Grandmother     Cancer Brother Lung    Cancer Brother         Esophagus       CareTeam (Including outside providers/suppliers regularly involved in providing care):   Patient Care Team:  Jeanette Nolan MD as PCP - General (Internal Medicine)  Mel Sparrow MD as PCP - [facility]    Wt Readings from Last 3 Encounters:   08/30/21 176 lb (79.8 kg)   12/05/19 167 lb 1.7 oz (75.8 kg)   06/12/19 167 lb (75.8 kg)     Vitals:    08/30/21 1027   BP: 128/80   Pulse: 65   SpO2: 98%   Weight: 176 lb (79.8 kg)   Height: 5' 5\" (1.651 m)     Body mass index is 29.29 kg/m². Based upon direct observation of the patient, evaluation of cognition reveals recent and remote memory intact. Patient's complete Health Risk Assessment and screening values have been reviewed and are found in Flowsheets. The following problems were reviewed today and where indicated follow up appointments were made and/or referrals ordered. Positive Risk Factor Screenings with Interventions:          General Health and ACP:  General  In general, how would you say your health is?: Very Good  In the past 7 days, have you experienced any of the following?  New or Increased Pain, New or Increased Fatigue, Loneliness, Social Isolation, Stress or Anger?: None of These  Do you get the social and emotional support that you need?: Yes  Do you have a Living Will?: Yes  Advance Directives     Power of [address] [place] [address] Will ACP-Advance Directive ACP-Power of     Not on File Not on File Not on File Not on File      General Health Risk Interventions:  · Full Code     Hearing/Vision:  No exam data present  Hearing/Vision  Do you or your family notice any trouble with your hearing that hasn't been managed with hearing aids?: No  Do you have difficulty driving, watching TV, or doing any of your daily activities because of your eyesight?: No  Have you had an eye exam within the past year?: (!) No  Hearing/Vision Interventions:  · Vision concerns:  patient encouraged to make appointment with his/her eye specialist      Personalized Preventive Plan   Current Health Maintenance Status  Immunization History   Administered Date(s) Administered    COVID-19, Moderna, PF, 100mcg/0.5mL 01/29/2021, 02/26/2021    Influenza, High Dose (Fluzone 65 yrs and older) 09/09/2016, 10/09/2018    Pneumococcal Conjugate 13-valent ([id number]) 05/24/2016    Pneumococcal Polysaccharide ([id number]) 06/08/2017        Health Maintenance   Topic Date Due    Hepatitis C screen  Never done    DTaP/Tdap/Td vaccine (1 - Tdap) Never done    Shingles Vaccine (1 of 2) Never done   [employer] Visit (AWV)  06/17/2021    Flu vaccine (1) 09/01/2021    DEXA (modify frequency per FRAX score)  Completed    Pneumococcal 65+ years Vaccine  Completed    COVID-19 Vaccine  Completed    Hepatitis A vaccine  Aged Out    Hepatitis B vaccine  Aged Out    Hib vaccine  Aged Out    Meningococcal (ACWY) vaccine  Aged Out       Lab Results   Component Value Date    LABA1C <3.5 07/01/2020    LABA1C 3.9 10/26/2018    LABA1C 3.7 10/16/2018    LABMICR Not Indicated 10/25/2018     Lab Results   Component Value Date     07/01/2020    K 4.4 07/01/2020     07/01/2020    CO2 19 (L) 07/01/2020    BUN 36 (H) 07/01/2020    CREATININE 1.3 (H) 07/01/2020    GLUCOSE 78 07/01/2020    CALCIUM 10.3 07/01/2020     Lab Results   Component Value Date    CHOL 177 07/01/2020    TRIG 171 07/01/2020    HDL 43 07/01/2020    LDLCALC 100 07/01/2020     Lab Results   Component Value Date    ALT 16 07/01/2020    AST 17 07/01/2020     No results found for: TSH, T4FREE  Lab Results   Component Value Date    WBC 3.7 (L) 07/01/2020    HGB 10.5 (L) 07/01/2020    HCT 31.3 (L) 07/01/2020    .7 (H) 07/01/2020     07/01/2020     Lab Results   Component Value Date    INR 0.97 10/16/2018    INR 1.03 01/18/2017      No results found for: PSA   No results found for: [facility]       Recommendations for Preventive Services Due: see orders and patient instructions/AVS.  . Recommended screening schedule for the next 5-10 years is provided to the patient in written form: see Patient Instructions/AVS.    Nacho Moran was seen today for medicare aw.     Diagnoses and all orders for this visit:    Routine general medical examination at a health care facility  -     Full code

## 2021-08-30 NOTE — PATIENT INSTRUCTIONS
Personalized Preventive Plan for Pepito Gutierrez - 8/30/2021  Medicare offers a range of preventive health benefits. Some of the tests and screenings are paid in full while other may be subject to a deductible, co-insurance, and/or copay. Some of these benefits include a comprehensive review of your medical history including lifestyle, illnesses that may run in your family, and various assessments and screenings as appropriate. After reviewing your medical record and screening and assessments performed today your provider may have ordered immunizations, labs, imaging, and/or referrals for you. A list of these orders (if applicable) as well as your Preventive Care list are included within your After Visit Summary for your review. Other Preventive Recommendations:    · A preventive eye exam performed by an eye specialist is recommended every 1-2 years to screen for glaucoma; cataracts, macular degeneration, and other eye disorders. · A preventive dental visit is recommended every 6 months. · Try to get at least 150 minutes of exercise per week or 10,000 steps per day on a pedometer . · Order or download the FREE \"Exercise & Physical Activity: Your Everyday Guide\" from The [employer]. Call 3-196.773.6539 or search The [employer] online. · You need 8223-8384 mg of calcium and 6262-5581 IU of vitamin D per day. It is possible to meet your calcium requirement with diet alone, but a vitamin D supplement is usually necessary to meet this goal.  · When exposed to the sun, use a sunscreen that protects against both UVA and UVB radiation with an SPF of 30 or greater. Reapply every 2 to 3 hours or after sweating, drying off with a towel, or swimming. · Always wear a seat belt when traveling in a car. Always wear a helmet when riding a bicycle or motorcycle.

## 2021-11-04 ENCOUNTER — OFFICE VISIT (OUTPATIENT)
Dept: INTERNAL MEDICINE CLINIC | Age: 81
End: 2021-11-04
Payer: MEDICARE

## 2021-11-04 VITALS
OXYGEN SATURATION: 98 % | HEIGHT: 65 IN | BODY MASS INDEX: 29.16 KG/M2 | WEIGHT: 175 LBS | HEART RATE: 80 BPM | DIASTOLIC BLOOD PRESSURE: 62 MMHG | SYSTOLIC BLOOD PRESSURE: 126 MMHG

## 2021-11-04 DIAGNOSIS — Z01.818 PREOP EXAM FOR INTERNAL MEDICINE: ICD-10-CM

## 2021-11-04 DIAGNOSIS — H25.013 CORTICAL AGE-RELATED CATARACT OF BOTH EYES: Primary | ICD-10-CM

## 2021-11-04 DIAGNOSIS — Z23 NEED FOR INFLUENZA VACCINATION: ICD-10-CM

## 2021-11-04 PROCEDURE — 99213 OFFICE O/P EST LOW 20 MIN: CPT | Performed by: INTERNAL MEDICINE

## 2021-11-04 PROCEDURE — G0008 ADMIN INFLUENZA VIRUS VAC: HCPCS | Performed by: INTERNAL MEDICINE

## 2021-11-04 PROCEDURE — 90694 VACC AIIV4 NO PRSRV 0.5ML IM: CPT | Performed by: INTERNAL MEDICINE

## 2021-11-04 NOTE — PROGRESS NOTES
request of surgeon, Dr. Chandu Najera, who plans on performing RIGHT CATARACT ON November 9 AND LEFT on November 30 at [facility] at [facility]. The current problem began 1 year ago, and symptoms have been worsening with time.   Conservative therapy: No.      Planned anesthesia: per opthal   Known anesthesia problems: None   Bleeding risk: No recent or remote history of abnormal bleeding  Personal or FH of DVT/PE: No    Patient objection to receiving blood products: No    Allergies   Allergen Reactions    Celebrex [Celecoxib]      Leg cramps    Sulfa Antibiotics Hives and Diarrhea     Outpatient Medications Marked as Taking for the 11/4/21 encounter (Office Visit) with Krish Sparrow MD   Medication Sig Dispense Refill    Multiple Vitamins-Minerals (THERAPEUTIC MULTIVITAMIN-MINERALS) tablet Take 1 tablet by mouth daily      Cyanocobalamin (B-12) 100 MCG TABS Take 1 tablet by mouth daily       calcium carbonate (OSCAL) 500 MG TABS tablet Take 500 mg by mouth daily         Patient Active Problem List   Diagnosis    Chronic midline low back pain with left-sided sciatica    Breast density    Hypoglycemia    Anemia    Arthritis of right hip    Status post total replacement of right hip    Status post total hip replacement, right    Renal insufficiency       Past Medical History:   Diagnosis Date    Arthritis of left hip     Cancer ([facility] [address] 2014    basal cell removed from nose    Chronic midline low back pain with left-sided sciatica     Hypoglycemia     Renal insufficiency 8/30/2021    Status post total replacement of left hip 1/26/2017     Past Surgical History:   Procedure Laterality Date    JOINT REPLACEMENT Left 01/26/2017    LEFT TOTAL HIP REPLACEMENT WITH APPLICATION OF PLATELET RICH    [place] TOTAL HIP ARTHROPLASTY Right 10/25/2018    RIGHT TOTAL HIP REPLACEMENT WITH CELLSAVER AND PLATELET GEL               SMTIH & NEPHEW performed by [facility] Had[facility] at [facility]  11/2016     [facility] History   Problem Relation Age of Onset    Dementia Mother     Cancer Sister         Lung    Cancer Brother         Prostate    Arthritis Brother     Diabetes Paternal Aunt     Diabetes Paternal Grandmother     Cancer Brother         Lung    Cancer Brother         Esophagus     Social History     Socioeconomic History    Marital status:      Spouse name: Not on file    Number of children: Not on file    Years of education: Not on file    Highest education level: Not on file   Occupational History    Not on file   Tobacco Use    Smoking status: Never Smoker    Smokeless tobacco: Never Used   Vaping Use    Vaping Use: Never used   Substance and Sexual Activity    Alcohol use: Yes     Alcohol/week: 0.0 standard drinks     Comment: very rare    Drug use: No    Sexual activity: Yes     Partners: Male   Other Topics Concern    Not on file   Social History Narrative    Not on file     Social Determinants of Health     Financial Resource Strain: Low Risk     Difficulty of Paying Living Expenses: Not hard at all   Food Insecurity: No Food Insecurity    Worried About [address] in the Last Year: Never true    [facility] in the Last Year: Never true   Transportation Needs:     Lack of Transportation (Medical):      Lack of Transportation (Non-Medical):    Physical Activity:     Days of Exercise per Week:     Minutes of Exercise per Session:    Stress:     Feeling of Stress :    Social Connections:     Frequency of Communication with Friends and Family:     Frequency of Social Gatherings with Friends and Family:     Attends Yazidism Services:     Active Member of Clubs or Organizations:     Attends Club or Organization Meetings:     Marital Status:    Intimate Partner Violence:     Fear of Current or Ex-Partner:     Emotionally Abused:     Physically Abused:     Sexually Abused:        Review of Systems  A comprehensive review of systems was negative except for what was noted in the HPI. Physical Exam   Vitals:    11/04/21 1146   BP: 126/62   Pulse: 80   SpO2: 98%   Weight: 175 lb (79.4 kg)   Height: 5' 5\" (1.651 m)     Wt Readings from Last 2 Encounters:   11/04/21 175 lb (79.4 kg)   08/30/21 176 lb (79.8 kg)     BP Readings from Last 3 Encounters:   11/04/21 126/62   08/30/21 128/80   06/12/19 112/62     Body mass index is 29.12 kg/m². Constitutional: She is oriented to person, place, and time. She appears well-developed and well-nourished. No distress. HENT:   Head: Normocephalic and atraumatic. Mouth/Throat: Uvula is midline, oropharynx is clear and moist and mucous membranes are normal.   Eyes: Conjunctivae and EOM are normal. Pupils are equal, round, and reactive to light. Neck: Trachea normal and normal range of motion. Neck supple. No JVD present. Carotid bruit is not present. No mass and no thyromegaly present. Cardiovascular: Normal rate, regular rhythm, normal heart sounds and intact distal pulses. Exam reveals no gallop and no friction rub. No murmur heard. Pulmonary/Chest: Effort normal and breath sounds normal. No respiratory distress. She has no wheezes. She has no rales. Abdominal: Soft. Normal aorta and bowel sounds are normal. She exhibits no distension and no mass. There is no hepatosplenomegaly. No tenderness. Musculoskeletal: She exhibits no edema and no tenderness. Neurological: She is alert and oriented to person, place, and time. She has normal strength. No cranial nerve deficit or sensory deficit. Coordination and gait normal.   Skin: Skin is warm and dry. No rash noted. No erythema.      Lab Results   Component Value Date    LABA1C <3.5 07/01/2020    LABA1C 3.9 10/26/2018    LABA1C 3.7 10/16/2018    LABMICR Not Indicated 10/25/2018     Lab Results   Component Value Date     08/30/2021    K 4.6 08/30/2021     08/30/2021    CO2 22 08/30/2021    BUN 26 (H) 08/30/2021    CREATININE 1.5 (H) 08/30/2021    GLUCOSE 95 08/30/2021    CALCIUM 10.0 08/30/2021     Lab Results   Component Value Date    CHOL 177 07/01/2020    TRIG 171 07/01/2020    HDL 43 07/01/2020    LDLCALC 100 07/01/2020     Lab Results   Component Value Date    ALT 16 07/01/2020    AST 17 07/01/2020     No results found for: TSH, T4FREE  Lab Results   Component Value Date    WBC 5.9 08/30/2021    HGB 9.6 (L) 08/30/2021    HCT 28.6 (L) 08/30/2021    .0 08/30/2021     08/30/2021     Lab Results   Component Value Date    INR 0.97 10/16/2018    INR 1.03 01/18/2017      No results found for: PSA   No results found for: LABURIC         No contraindications to planned surgery      . Althea Pulliam M.D.   [address] [facility]  Office: [phone]  Fax: [phone]

## 2022-08-31 ENCOUNTER — OFFICE VISIT (OUTPATIENT)
Dept: INTERNAL MEDICINE CLINIC | Age: 82
End: 2022-08-31
Payer: MEDICARE

## 2022-08-31 VITALS
DIASTOLIC BLOOD PRESSURE: 62 MMHG | SYSTOLIC BLOOD PRESSURE: 120 MMHG | BODY MASS INDEX: 25.71 KG/M2 | OXYGEN SATURATION: 99 % | WEIGHT: 160 LBS | HEART RATE: 80 BPM | HEIGHT: 66 IN

## 2022-08-31 DIAGNOSIS — D64.9 CHRONIC ANEMIA: ICD-10-CM

## 2022-08-31 DIAGNOSIS — Z00.00 MEDICARE ANNUAL WELLNESS VISIT, SUBSEQUENT: Primary | ICD-10-CM

## 2022-08-31 PROCEDURE — G0439 PPPS, SUBSEQ VISIT: HCPCS | Performed by: INTERNAL MEDICINE

## 2022-08-31 PROCEDURE — 1123F ACP DISCUSS/DSCN MKR DOCD: CPT | Performed by: INTERNAL MEDICINE

## 2022-08-31 RX ORDER — FERROUS SULFATE 325(65) MG
325 TABLET ORAL EVERY OTHER DAY
Qty: 1 TABLET | Refills: 0 | Status: SHIPPED | OUTPATIENT
Start: 2022-08-31

## 2022-08-31 SDOH — ECONOMIC STABILITY: FOOD INSECURITY: WITHIN THE PAST 12 MONTHS, YOU WORRIED THAT YOUR FOOD WOULD RUN OUT BEFORE YOU GOT MONEY TO BUY MORE.: NEVER TRUE

## 2022-08-31 SDOH — ECONOMIC STABILITY: FOOD INSECURITY: WITHIN THE PAST 12 MONTHS, THE FOOD YOU BOUGHT JUST DIDN'T LAST AND YOU DIDN'T HAVE MONEY TO GET MORE.: NEVER TRUE

## 2022-08-31 ASSESSMENT — PATIENT HEALTH QUESTIONNAIRE - PHQ9
1. LITTLE INTEREST OR PLEASURE IN DOING THINGS: 0
SUM OF ALL RESPONSES TO PHQ QUESTIONS 1-9: 0
SUM OF ALL RESPONSES TO PHQ9 QUESTIONS 1 & 2: 0
SUM OF ALL RESPONSES TO PHQ QUESTIONS 1-9: 0
2. FEELING DOWN, DEPRESSED OR HOPELESS: 0

## 2022-08-31 ASSESSMENT — LIFESTYLE VARIABLES
HOW MANY STANDARD DRINKS CONTAINING ALCOHOL DO YOU HAVE ON A TYPICAL DAY: PATIENT DOES NOT DRINK
HOW OFTEN DO YOU HAVE A DRINK CONTAINING ALCOHOL: NEVER

## 2022-08-31 ASSESSMENT — SOCIAL DETERMINANTS OF HEALTH (SDOH): HOW HARD IS IT FOR YOU TO PAY FOR THE VERY BASICS LIKE FOOD, HOUSING, MEDICAL CARE, AND HEATING?: NOT HARD AT ALL

## 2022-08-31 NOTE — PROGRESS NOTES
Medicare Annual Wellness Visit    Viviana Barrera is here for Medicare AWV    Assessment & Plan     Medicare annual wellness visit, subsequent  -     Full code  Chronic anemia  Start ferrous sulfate (IRON 325) 325 (65 Fe) MG tablet; Take 1 tablet by mouth every other day, Disp-1 tablet, R-0Normal      Recommendations for Preventive Services Due: see orders and patient instructions/AVS.  Recommended screening schedule for the next 5-10 years is provided to the patient in written form: see Patient Instructions/AVS.     Return for Medicare Annual Wellness Visit in 1 year. Subjective       Patient's complete Health Risk Assessment and screening values have been reviewed and are found in Flowsheets. The following problems were reviewed today and where indicated follow up appointments were made and/or referrals ordered. Positive Risk Factor Screenings with Interventions:    Fall Risk:  Do you feel unsteady or are you worried about falling? : no  2 or more falls in past year?: no  Fall with injury in past year?: (!) yes (fractured wrist)   Fall Risk Interventions:                General Health and ACP:  General  In general, how would you say your health is?: Good  In the past 7 days, have you experienced any of the following: New or Increased Pain, New or Increased Fatigue, Loneliness, Social Isolation, Stress or Anger?: No  Do you get the social and emotional support that you need?: Yes  Do you have a Living Will?: Yes    Advance Directives       Power of  Living Will ACP-Advance Directive ACP-Power of     Not on File Not on File Not on File Not on File          General Health Risk Interventions:  Full code              Objective   Vitals:    08/31/22 1009   BP: 120/62   Pulse: 80   SpO2: 99%   Weight: 160 lb (72.6 kg)   Height: 5' 5.75\" (1.67 m)      Body mass index is 26.02 kg/m².              Allergies   Allergen Reactions    Celebrex [Celecoxib]      Leg cramps    Sulfa Antibiotics Hives and Diarrhea     Prior to Visit Medications    Medication Sig Taking?  Authorizing Provider   Multiple Vitamins-Minerals (THERAPEUTIC MULTIVITAMIN-MINERALS) tablet Take 1 tablet by mouth daily Yes Historical Provider, MD   Cyanocobalamin (B-12) 100 MCG TABS Take 1 tablet by mouth daily  Yes Historical Provider, MD   calcium carbonate (OSCAL) 500 MG TABS tablet Take 500 mg by mouth daily Yes Historical Provider, MD       Lab Results   Component Value Date    LABA1C <3.5 07/01/2020    LABA1C 3.9 10/26/2018    LABA1C 3.7 10/16/2018    LABMICR Not Indicated 10/25/2018     Lab Results   Component Value Date     08/30/2021    K 4.6 08/30/2021     08/30/2021    CO2 22 08/30/2021    BUN 26 (H) 08/30/2021    CREATININE 1.5 (H) 08/30/2021    GLUCOSE 95 08/30/2021    CALCIUM 10.0 08/30/2021     Lab Results   Component Value Date/Time    CHOL 177 07/01/2020 08:47 AM    TRIG 171 07/01/2020 08:47 AM    HDL 43 07/01/2020 08:47 AM    LDLCALC 100 07/01/2020 08:47 AM     Lab Results   Component Value Date    ALT 16 07/01/2020    AST 17 07/01/2020     No results found for: TSH, T4FREE, T3FREE  Lab Results   Component Value Date    WBC 5.9 08/30/2021    HGB 9.6 (L) 08/30/2021    HCT 28.6 (L) 08/30/2021    .0 08/30/2021     08/30/2021     Lab Results   Component Value Date    INR 0.97 10/16/2018    INR 1.03 01/18/2017      No results found for: PSA   No results found for: Constantine Sandifer (Including outside providers/suppliers regularly involved in providing care):   Patient Care Team:  Russ Gan MD as PCP - General (Internal Medicine)  Russ Gan MD as PCP - [facility]     Reviewed and updated this visit:  Tobacco  Allergies  Meds  Problems  Med Hx  Surg Hx  Soc Hx  Fam Hx

## 2022-08-31 NOTE — PATIENT INSTRUCTIONS
Personalized Preventive Plan for Alexis Hyatt - 8/31/2022  Medicare offers a range of preventive health benefits. Some of the tests and screenings are paid in full while other may be subject to a deductible, co-insurance, and/or copay. Some of these benefits include a comprehensive review of your medical history including lifestyle, illnesses that may run in your family, and various assessments and screenings as appropriate. After reviewing your medical record and screening and assessments performed today your provider may have ordered immunizations, labs, imaging, and/or referrals for you. A list of these orders (if applicable) as well as your Preventive Care list are included within your After Visit Summary for your review. Other Preventive Recommendations:    A preventive eye exam performed by an eye specialist is recommended every 1-2 years to screen for glaucoma; cataracts, macular degeneration, and other eye disorders. A preventive dental visit is recommended every 6 months. Try to get at least 150 minutes of exercise per week or 10,000 steps per day on a pedometer . Order or download the FREE \"Exercise & Physical Activity: Your Everyday Guide\" from [employer]. Call 5-413.857.6475 or search [employer] online. You need 2244-1053 mg of calcium and 4400-1158 IU of vitamin D per day. It is possible to meet your calcium requirement with diet alone, but a vitamin D supplement is usually necessary to meet this goal.  When exposed to the sun, use a sunscreen that protects against both UVA and UVB radiation with an SPF of 30 or greater. Reapply every 2 to 3 hours or after sweating, drying off with a towel, or swimming. Always wear a seat belt when traveling in a car. Always wear a helmet when riding a bicycle or motorcycle.

## 2023-08-30 SDOH — ECONOMIC STABILITY: TRANSPORTATION INSECURITY
IN THE PAST 12 MONTHS, HAS LACK OF TRANSPORTATION KEPT YOU FROM MEETINGS, WORK, OR FROM GETTING THINGS NEEDED FOR DAILY LIVING?: NO

## 2023-08-30 SDOH — ECONOMIC STABILITY: FOOD INSECURITY: WITHIN THE PAST 12 MONTHS, YOU WORRIED THAT YOUR FOOD WOULD RUN OUT BEFORE YOU GOT MONEY TO BUY MORE.: NEVER TRUE

## 2023-08-30 SDOH — ECONOMIC STABILITY: HOUSING INSECURITY
IN THE LAST 12 MONTHS, WAS THERE A TIME WHEN YOU DID NOT HAVE A STEADY PLACE TO SLEEP OR SLEPT IN A SHELTER (INCLUDING NOW)?: NO

## 2023-08-30 SDOH — ECONOMIC STABILITY: FOOD INSECURITY: WITHIN THE PAST 12 MONTHS, THE FOOD YOU BOUGHT JUST DIDN'T LAST AND YOU DIDN'T HAVE MONEY TO GET MORE.: NEVER TRUE

## 2023-08-30 SDOH — HEALTH STABILITY: PHYSICAL HEALTH: ON AVERAGE, HOW MANY DAYS PER WEEK DO YOU ENGAGE IN MODERATE TO STRENUOUS EXERCISE (LIKE A BRISK WALK)?: 3 DAYS

## 2023-08-30 SDOH — ECONOMIC STABILITY: INCOME INSECURITY: HOW HARD IS IT FOR YOU TO PAY FOR THE VERY BASICS LIKE FOOD, HOUSING, MEDICAL CARE, AND HEATING?: NOT VERY HARD

## 2023-08-30 ASSESSMENT — PATIENT HEALTH QUESTIONNAIRE - PHQ9
2. FEELING DOWN, DEPRESSED OR HOPELESS: 0
1. LITTLE INTEREST OR PLEASURE IN DOING THINGS: 0
SUM OF ALL RESPONSES TO PHQ QUESTIONS 1-9: 0
SUM OF ALL RESPONSES TO PHQ9 QUESTIONS 1 & 2: 0
SUM OF ALL RESPONSES TO PHQ QUESTIONS 1-9: 0

## 2023-08-30 ASSESSMENT — LIFESTYLE VARIABLES
HOW OFTEN DO YOU HAVE SIX OR MORE DRINKS ON ONE OCCASION: 1
HOW MANY STANDARD DRINKS CONTAINING ALCOHOL DO YOU HAVE ON A TYPICAL DAY: 0
HOW OFTEN DO YOU HAVE A DRINK CONTAINING ALCOHOL: 1
HOW MANY STANDARD DRINKS CONTAINING ALCOHOL DO YOU HAVE ON A TYPICAL DAY: PATIENT DOES NOT DRINK
HOW OFTEN DO YOU HAVE A DRINK CONTAINING ALCOHOL: NEVER

## 2023-08-31 ENCOUNTER — OFFICE VISIT (OUTPATIENT)
Dept: INTERNAL MEDICINE CLINIC | Age: 83
End: 2023-08-31

## 2023-08-31 VITALS
OXYGEN SATURATION: 98 % | SYSTOLIC BLOOD PRESSURE: 124 MMHG | HEIGHT: 66 IN | BODY MASS INDEX: 27.16 KG/M2 | WEIGHT: 169 LBS | DIASTOLIC BLOOD PRESSURE: 62 MMHG | HEART RATE: 70 BPM

## 2023-08-31 DIAGNOSIS — M21.611 BUNION OF RIGHT FOOT: ICD-10-CM

## 2023-08-31 DIAGNOSIS — Z00.00 MEDICARE ANNUAL WELLNESS VISIT, SUBSEQUENT: Primary | ICD-10-CM

## 2023-08-31 DIAGNOSIS — D64.9 CHRONIC ANEMIA: ICD-10-CM

## 2023-09-01 LAB
BASOPHILS # BLD: 0.1 K/UL (ref 0–0.2)
BASOPHILS NFR BLD: 1.5 %
DEPRECATED RDW RBC AUTO: 14.2 % (ref 12.4–15.4)
EOSINOPHIL # BLD: 0.2 K/UL (ref 0–0.6)
EOSINOPHIL NFR BLD: 5.7 %
HCT VFR BLD AUTO: 31.5 % (ref 36–48)
HGB BLD-MCNC: 10.3 G/DL (ref 12–16)
LYMPHOCYTES # BLD: 0.8 K/UL (ref 1–5.1)
LYMPHOCYTES NFR BLD: 22.6 %
MCH RBC QN AUTO: 33 PG (ref 26–34)
MCHC RBC AUTO-ENTMCNC: 32.8 G/DL (ref 31–36)
MCV RBC AUTO: 100.8 FL (ref 80–100)
MONOCYTES # BLD: 0.3 K/UL (ref 0–1.3)
MONOCYTES NFR BLD: 6.9 %
NEUTROPHILS # BLD: 2.3 K/UL (ref 1.7–7.7)
NEUTROPHILS NFR BLD: 63.3 %
PLATELET # BLD AUTO: 178 K/UL (ref 135–450)
PMV BLD AUTO: 7.6 FL (ref 5–10.5)
RBC # BLD AUTO: 3.13 M/UL (ref 4–5.2)
WBC # BLD AUTO: 3.7 K/UL (ref 4–11)

## 2024-02-05 ENCOUNTER — E-VISIT (OUTPATIENT)
Dept: INTERNAL MEDICINE CLINIC | Age: 84
End: 2024-02-05
Payer: MEDICARE

## 2024-02-05 DIAGNOSIS — N39.0 UTI (URINARY TRACT INFECTION), UNCOMPLICATED: Primary | ICD-10-CM

## 2024-02-05 PROCEDURE — 99422 OL DIG E/M SVC 11-20 MIN: CPT | Performed by: INTERNAL MEDICINE

## 2024-02-05 RX ORDER — NITROFURANTOIN 25; 75 MG/1; MG/1
100 CAPSULE ORAL 2 TIMES DAILY
Qty: 14 CAPSULE | Refills: 0 | Status: SHIPPED | OUTPATIENT
Start: 2024-02-05 | End: 2024-02-12

## 2024-09-02 SDOH — HEALTH STABILITY: PHYSICAL HEALTH: ON AVERAGE, HOW MANY MINUTES DO YOU ENGAGE IN EXERCISE AT THIS LEVEL?: 30 MIN

## 2024-09-02 SDOH — HEALTH STABILITY: PHYSICAL HEALTH: ON AVERAGE, HOW MANY DAYS PER WEEK DO YOU ENGAGE IN MODERATE TO STRENUOUS EXERCISE (LIKE A BRISK WALK)?: 1 DAY

## 2024-09-02 ASSESSMENT — PATIENT HEALTH QUESTIONNAIRE - PHQ9
SUM OF ALL RESPONSES TO PHQ QUESTIONS 1-9: 0
SUM OF ALL RESPONSES TO PHQ QUESTIONS 1-9: 0
2. FEELING DOWN, DEPRESSED OR HOPELESS: NOT AT ALL
SUM OF ALL RESPONSES TO PHQ QUESTIONS 1-9: 0
1. LITTLE INTEREST OR PLEASURE IN DOING THINGS: NOT AT ALL
SUM OF ALL RESPONSES TO PHQ9 QUESTIONS 1 & 2: 0
SUM OF ALL RESPONSES TO PHQ QUESTIONS 1-9: 0

## 2024-09-02 ASSESSMENT — LIFESTYLE VARIABLES
HOW MANY STANDARD DRINKS CONTAINING ALCOHOL DO YOU HAVE ON A TYPICAL DAY: PATIENT DOES NOT DRINK
HOW OFTEN DO YOU HAVE A DRINK CONTAINING ALCOHOL: NEVER
HOW MANY STANDARD DRINKS CONTAINING ALCOHOL DO YOU HAVE ON A TYPICAL DAY: 0
HOW OFTEN DO YOU HAVE A DRINK CONTAINING ALCOHOL: 1
HOW OFTEN DO YOU HAVE SIX OR MORE DRINKS ON ONE OCCASION: 1

## 2024-09-04 ENCOUNTER — OFFICE VISIT (OUTPATIENT)
Dept: INTERNAL MEDICINE CLINIC | Age: 84
End: 2024-09-04

## 2024-09-04 VITALS
WEIGHT: 166 LBS | SYSTOLIC BLOOD PRESSURE: 128 MMHG | DIASTOLIC BLOOD PRESSURE: 68 MMHG | BODY MASS INDEX: 26.68 KG/M2 | OXYGEN SATURATION: 95 % | HEART RATE: 61 BPM | HEIGHT: 66 IN

## 2024-09-04 DIAGNOSIS — Z23 NEED FOR INFLUENZA VACCINATION: ICD-10-CM

## 2024-09-04 DIAGNOSIS — D64.9 CHRONIC ANEMIA: ICD-10-CM

## 2024-09-04 DIAGNOSIS — N28.9 RENAL INSUFFICIENCY: ICD-10-CM

## 2024-09-04 DIAGNOSIS — Z00.00 MEDICARE ANNUAL WELLNESS VISIT, SUBSEQUENT: Primary | ICD-10-CM

## 2024-09-04 DIAGNOSIS — Z13.220 SCREENING FOR LIPID DISORDERS: ICD-10-CM

## 2024-09-04 SDOH — ECONOMIC STABILITY: FOOD INSECURITY: WITHIN THE PAST 12 MONTHS, YOU WORRIED THAT YOUR FOOD WOULD RUN OUT BEFORE YOU GOT MONEY TO BUY MORE.: NEVER TRUE

## 2024-09-04 SDOH — ECONOMIC STABILITY: INCOME INSECURITY: HOW HARD IS IT FOR YOU TO PAY FOR THE VERY BASICS LIKE FOOD, HOUSING, MEDICAL CARE, AND HEATING?: NOT HARD AT ALL

## 2024-09-04 SDOH — ECONOMIC STABILITY: FOOD INSECURITY: WITHIN THE PAST 12 MONTHS, THE FOOD YOU BOUGHT JUST DIDN'T LAST AND YOU DIDN'T HAVE MONEY TO GET MORE.: NEVER TRUE

## 2024-09-04 NOTE — PROGRESS NOTES
Medicare Annual Wellness Visit    Hina Camacho is here for Medicare AWV and Anemia    Assessment & Plan   Medicare annual wellness visit, subsequent  Need for influenza vaccination  -     Influenza, FLUAD Trivalent, (age 65 y+), IM, Preservative Free, 0.5mL  Recommendations for Preventive Services Due: see orders and patient instructions/AVS.  Recommended screening schedule for the next 5-10 years is provided to the patient in written form: see Patient Instructions/AVS.     No follow-ups on file.     Subjective       Patient's complete Health Risk Assessment and screening values have been reviewed and are found in Flowsheets. The following problems were reviewed today and where indicated follow up appointments were made and/or referrals ordered.    Positive Risk Factor Screenings with Interventions:                Inactivity:  On average, how many days per week do you engage in moderate to strenuous exercise (like a brisk walk)?: 1 day (!) Abnormal  On average, how many minutes do you engage in exercise at this level?: 30 min  Interventions:  Patient declined any further interventions or treatment        Vision Screen:  Do you have difficulty driving, watching TV, or doing any of your daily activities because of your eyesight?: No  Have you had an eye exam within the past year?: (!) No (discussed, needs to schedule)  Interventions:   Patient encouraged to make appointment with their eye specialist                Objective   Vitals:    09/04/24 0951   BP: 128/68   Site: Left Upper Arm   Position: Sitting   Cuff Size: Medium Adult   Pulse: 61   SpO2: 95%   Weight: 75.3 kg (166 lb)   Height: 1.67 m (5' 5.75\")      Body mass index is 27 kg/m².                    Allergies   Allergen Reactions    Celebrex [Celecoxib]      Leg cramps    Sulfa Antibiotics Hives and Diarrhea     Prior to Visit Medications    Medication Sig Taking? Authorizing Provider   ferrous sulfate (IRON 325) 325 (65 Fe) MG tablet Take 1 tablet by

## 2024-09-04 NOTE — PROGRESS NOTES
Hina Camacho  YOB: 1940    Date of Service:  9/4/2024    Chief Complaint:      Chief Complaint   Patient presents with    Medicare AWV    Anemia       Assessment/Plan:  Hina was seen today for medicare awv and anemia.    Diagnoses and all orders for this visit:    Medicare annual wellness visit, subsequent    Need for influenza vaccination  -     Influenza, FLUAD Trivalent, (age 65 y+), IM, Preservative Free, 0.5mL    Screening for lipid disorders  -     LIPID PANEL; Future    Chronic anemia  -     CBC with Auto Differential; Future    Renal insufficiency  -     Comprehensive Metabolic Panel; Future    Stable and continue on current medications.    Return AWV and f/u 8/25.      HPI:  Hina Camacho is a 84 y.o.    Chronic anemia:  stable on iron daily  Renal insufficiency:  stable and will monitor.    Lab Results   Component Value Date    LABA1C <3.5 07/01/2020    LABA1C 3.9 10/26/2018    LABA1C 3.7 10/16/2018     Lab Results   Component Value Date     08/30/2021    K 4.6 08/30/2021     08/30/2021    CO2 22 08/30/2021    BUN 26 (H) 08/30/2021    CREATININE 1.5 (H) 08/30/2021    GLUCOSE 95 08/30/2021    CALCIUM 10.0 08/30/2021     Lab Results   Component Value Date/Time    CHOL 177 07/01/2020 08:47 AM    TRIG 171 07/01/2020 08:47 AM    HDL 43 07/01/2020 08:47 AM     Lab Results   Component Value Date    ALT 16 07/01/2020    AST 17 07/01/2020     Lab Results   Component Value Date    TSH 3.85 07/02/2017     Lab Results   Component Value Date    WBC 3.7 (L) 08/31/2023    HGB 10.3 (L) 08/31/2023    HCT 31.5 (L) 08/31/2023    .8 (H) 08/31/2023     08/31/2023     Lab Results   Component Value Date    INR 0.97 10/16/2018    INR 1.03 01/18/2017      No results found for: \"PSA\"   No results found for: \"LABURIC\"     Patient Active Problem List   Diagnosis    Chronic midline low back pain with left-sided sciatica    Breast density    Hypoglycemia    Chronic anemia    Arthritis

## 2025-06-09 ENCOUNTER — OFFICE VISIT (OUTPATIENT)
Dept: FAMILY MEDICINE CLINIC | Age: 85
End: 2025-06-09
Payer: MEDICARE

## 2025-06-09 VITALS
HEIGHT: 66 IN | OXYGEN SATURATION: 100 % | SYSTOLIC BLOOD PRESSURE: 140 MMHG | HEART RATE: 68 BPM | BODY MASS INDEX: 27.16 KG/M2 | WEIGHT: 169 LBS | DIASTOLIC BLOOD PRESSURE: 70 MMHG

## 2025-06-09 DIAGNOSIS — Z76.89 ENCOUNTER TO ESTABLISH CARE: Primary | ICD-10-CM

## 2025-06-09 DIAGNOSIS — Z13.220 SCREENING FOR HYPERLIPIDEMIA: ICD-10-CM

## 2025-06-09 DIAGNOSIS — Z13.1 SCREENING FOR DIABETES MELLITUS (DM): ICD-10-CM

## 2025-06-09 PROCEDURE — 99213 OFFICE O/P EST LOW 20 MIN: CPT | Performed by: STUDENT IN AN ORGANIZED HEALTH CARE EDUCATION/TRAINING PROGRAM

## 2025-06-09 PROCEDURE — 1123F ACP DISCUSS/DSCN MKR DOCD: CPT | Performed by: STUDENT IN AN ORGANIZED HEALTH CARE EDUCATION/TRAINING PROGRAM

## 2025-06-09 PROCEDURE — 1159F MED LIST DOCD IN RCRD: CPT | Performed by: STUDENT IN AN ORGANIZED HEALTH CARE EDUCATION/TRAINING PROGRAM

## 2025-06-09 SDOH — ECONOMIC STABILITY: FOOD INSECURITY: WITHIN THE PAST 12 MONTHS, THE FOOD YOU BOUGHT JUST DIDN'T LAST AND YOU DIDN'T HAVE MONEY TO GET MORE.: NEVER TRUE

## 2025-06-09 SDOH — ECONOMIC STABILITY: FOOD INSECURITY: WITHIN THE PAST 12 MONTHS, YOU WORRIED THAT YOUR FOOD WOULD RUN OUT BEFORE YOU GOT MONEY TO BUY MORE.: NEVER TRUE

## 2025-06-09 ASSESSMENT — ENCOUNTER SYMPTOMS
COUGH: 0
DIARRHEA: 0
CONSTIPATION: 0
SHORTNESS OF BREATH: 0

## 2025-06-09 NOTE — PROGRESS NOTES
fatigue, which resulted in hospitalization. Despite extensive testing, no underlying cause was identified. Her primary care physician advised her to continue her current dietary regimen, which has been effective in managing her symptoms.    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 She was under the care of Dr. Rogers for this condition but has not seen him since 2018. Two years ago, she sustained a fracture to her left wrist, which was managed by an orthopedic specialist from [facility]. Her wrist has since healed without complications. She has only been hospitalized once, in 2016, for issues related to her blood sugar. She has not had any emergency room visits, except for the one related to her wrist fracture. She does not currently see a dermatologist, although she did have a basal cell carcinoma in the past.    She is scheduled for cataract surgery at [facility] next month. She is currently taking calcium, vitamins, and iron supplements.    PAST SURGICAL HISTORY:  - Hip replacement surgery on both sides ([id number])  - Fracture management of the left wrist (2023)  - Whitney teeth extraction    Patient's pertinent past medical history and medications were reviewed and updated as appropriate today.    Review of Systems:  Review of Systems   Constitutional:  Negative for fatigue and fever.   HENT:  Negative for congestion.    Respiratory:  Negative for cough and shortness of breath.    Cardiovascular:  Negative for leg swelling.   Gastrointestinal:  Negative for constipation and diarrhea.   Genitourinary:  Negative for dysuria.   Neurological:  Negative for headaches.   Psychiatric/Behavioral:  Negative for sleep disturbance. The patient is not nervous/anxious.        Objective:    Physical Exam:   Vitals:    06/09/25 1443   BP: (!) 140/70   Pulse: 68   SpO2: 100%   Weight: 76.7 kg (169 lb)

## 2025-06-30 ENCOUNTER — TELEPHONE (OUTPATIENT)
Dept: FAMILY MEDICINE CLINIC | Age: 85
End: 2025-06-30

## 2025-06-30 NOTE — TELEPHONE ENCOUNTER
----- Message from Claire BOSTON sent at 6/27/2025  9:53 AM EDT -----  Regarding: [facility] Appointment Request  [facility] Appointment Request    Patient needs appointment for E[facility] Appointment Type: Pre-Op Visit.    Patient Requested Dates(s): Before July 22, 2025  Patient Requested Time: Anytime   Provider Name:  Vince Ca Jr., MD    Reason for Appointment Request: Established Patient - Available appointments did not meet patient need  --------------------------------------------------------------------------------------------------------------------------    Relationship to Patient: Self     Call Back Information: OK to leave message on voicemail  Preferred Call Back Number: Phone - [phone]    Patient called in to schedule her pre op surgery appointment. Surgery Details:  July 22, 2025 - cataract surgery - Dr. Rios [facility]

## 2025-07-15 ENCOUNTER — OFFICE VISIT (OUTPATIENT)
Dept: FAMILY MEDICINE CLINIC | Age: 85
End: 2025-07-15

## 2025-07-15 VITALS
HEART RATE: 75 BPM | SYSTOLIC BLOOD PRESSURE: 140 MMHG | BODY MASS INDEX: 26.84 KG/M2 | DIASTOLIC BLOOD PRESSURE: 80 MMHG | WEIGHT: 167 LBS | HEIGHT: 66 IN | OXYGEN SATURATION: 99 %

## 2025-07-15 DIAGNOSIS — D50.8 OTHER IRON DEFICIENCY ANEMIA: ICD-10-CM

## 2025-07-15 DIAGNOSIS — Z01.818 PREOP EXAMINATION: Primary | ICD-10-CM

## 2025-07-15 DIAGNOSIS — H25.013 CORTICAL AGE-RELATED CATARACT OF BOTH EYES: ICD-10-CM

## 2025-07-15 ASSESSMENT — PATIENT HEALTH QUESTIONNAIRE - PHQ9
SUM OF ALL RESPONSES TO PHQ QUESTIONS 1-9: 0
SUM OF ALL RESPONSES TO PHQ QUESTIONS 1-9: 0
2. FEELING DOWN, DEPRESSED OR HOPELESS: NOT AT ALL
1. LITTLE INTEREST OR PLEASURE IN DOING THINGS: NOT AT ALL
SUM OF ALL RESPONSES TO PHQ QUESTIONS 1-9: 0
SUM OF ALL RESPONSES TO PHQ QUESTIONS 1-9: 0

## 2025-07-15 ASSESSMENT — ENCOUNTER SYMPTOMS
SHORTNESS OF BREATH: 0
COUGH: 0
CONSTIPATION: 0
DIARRHEA: 0

## 2025-07-15 NOTE — PROGRESS NOTES
Diabetes Paternal Aunt     Diabetes Paternal Grandmother     Cancer Brother         Lung    Cancer Brother         Esophagus       REVIEW OF SYSTEMS:    Review of Systems   Constitutional:  Negative for fatigue and fever.   HENT:  Negative for congestion.    Respiratory:  Negative for cough and shortness of breath.    Cardiovascular:  Negative for leg swelling.   Gastrointestinal:  Negative for constipation and diarrhea.   Genitourinary:  Negative for dysuria.   Neurological:  Negative for headaches.   Psychiatric/Behavioral:  Negative for sleep disturbance. The patient is not nervous/anxious.           BP (!) 140/80   Pulse 75   Ht 1.67 m (5' 5.75\")   Wt 75.8 kg (167 lb)   SpO2 99%   BMI 27.16 kg/m²    PHYSICAL EXAM:    Physical Exam  HENT:      Head: Normocephalic.      Right Ear: Tympanic membrane and ear canal normal. There is no impacted cerumen.      Left Ear: Tympanic membrane and ear canal normal. There is no impacted cerumen.      Nose: No congestion.      Mouth/Throat:      Mouth: Mucous membranes are moist.   Eyes:      Pupils: Pupils are equal, round, and reactive to light.   Cardiovascular:      Rate and Rhythm: Normal rate and regular rhythm.   Pulmonary:      Effort: Pulmonary effort is normal.      Breath sounds: Normal breath sounds.   Musculoskeletal:         General: No swelling.      Cervical back: Normal range of motion and neck supple.      Right lower leg: No edema.      Left lower leg: No edema.   Skin:     General: Skin is warm and dry.   Neurological:      General: No focal deficit present.      Mental Status: She is alert.             DATA:  Cardiac Risk Estimation: per the Revised Cardiac Risk Index, the patient's risk factors for cardiac complications include none, putting patient in: RCI RISK CLASS I (0 risk factors, risk of major cardiac compl. appr. 0.5%).     >4 METS: Reports able to garden (1.5hours) and climb up 2 flights of stairs without chest pain or shortness of breath.

## 2025-09-04 ENCOUNTER — HOSPITAL ENCOUNTER (OUTPATIENT)
Dept: LAB | Age: 85
Discharge: HOME | End: 2025-09-04
Payer: MEDICARE

## 2025-09-04 DIAGNOSIS — Z13.1 SCREENING FOR DIABETES MELLITUS (DM): ICD-10-CM

## 2025-09-04 DIAGNOSIS — Z13.220 SCREENING FOR HYPERLIPIDEMIA: ICD-10-CM

## 2025-09-04 LAB
ALBUMIN SERPL-MCNC: 4.4 G/DL (ref 3.4–5)
ALBUMIN/GLOB SERPL: 1.6 {RATIO} (ref 1.1–2.2)
ALP SERPL-CCNC: 82 U/L (ref 40–129)
ALT SERPL-CCNC: 16 U/L (ref 10–40)
ANION GAP SERPL CALCULATED.3IONS-SCNC: 10 MMOL/L (ref 3–16)
AST SERPL-CCNC: 19 U/L (ref 15–37)
BILIRUB SERPL-MCNC: 0.8 MG/DL (ref 0–1)
BUN SERPL-MCNC: 28 MG/DL (ref 7–20)
CALCIUM SERPL-MCNC: 10.4 MG/DL (ref 8.3–10.6)
CHLORIDE SERPL-SCNC: 110 MMOL/L (ref 99–110)
CHOLEST SERPL-MCNC: 172 MG/DL (ref 0–199)
CO2 SERPL-SCNC: 22 MMOL/L (ref 21–32)
CREAT SERPL-MCNC: 1.6 MG/DL (ref 0.6–1.2)
GFR SERPLBLD CREATININE-BSD FMLA CKD-EPI: 32 ML/MIN/{1.73_M2}
GLUCOSE SERPL-MCNC: 65 MG/DL (ref 70–99)
HDLC SERPL-MCNC: 52 MG/DL (ref 40–60)
LDLC SERPL CALC-MCNC: 86 MG/DL
POTASSIUM SERPL-SCNC: 4.2 MMOL/L (ref 3.5–5.1)
PROT SERPL-MCNC: 7.1 G/DL (ref 6.4–8.2)
SODIUM SERPL-SCNC: 142 MMOL/L (ref 136–145)
TRIGL SERPL-MCNC: 170 MG/DL (ref 0–150)
VLDLC SERPL CALC-MCNC: 34 MG/DL

## 2025-09-04 PROCEDURE — 80061 LIPID PANEL: CPT

## 2025-09-04 PROCEDURE — 36415 COLL VENOUS BLD VENIPUNCTURE: CPT

## 2025-09-04 PROCEDURE — 80053 COMPREHEN METABOLIC PANEL: CPT

## (undated) DEVICE — SOLUTION IRRIG 2000ML 0.9% SOD CHL USP UROMATIC PLAS CONT

## (undated) DEVICE — GLOVE SURG SZ 65 THK91MIL LTX FREE SYN POLYISOPRENE

## (undated) DEVICE — STERILE POLYISOPRENE POWDER-FREE SURGICAL GLOVES: Brand: PROTEXIS

## (undated) DEVICE — PENCIL SMK EVAC ALL IN 1 DSGN ENH VISIBILITY IMPROVED AIR

## (undated) DEVICE — SYRINGE MED 10ML LUERLOCK TIP W/O SFTY DISP

## (undated) DEVICE — SMARTGOWN BREATHABLE SURGICAL GOWN: Brand: CONVERTORS

## (undated) DEVICE — SPONGE LAP W18XL18IN WHT COT 4 PLY FLD STRUNG RADPQ DISP ST

## (undated) DEVICE — 3M™ STERI-DRAPE™ INSTRUMENT POUCH 1018: Brand: STERI-DRAPE™

## (undated) DEVICE — PILLOW POS W15XH6XL22IN RASPBERRY FOAM ABD W/ STRP DISP FOR

## (undated) DEVICE — STANDARD HYPODERMIC NEEDLE,POLYPROPYLENE HUB: Brand: MONOJECT

## (undated) DEVICE — SUCTION RESERVOIR 400CC LG VOL: Brand: CARDINAL HEALTH

## (undated) DEVICE — NEEDLE HYPO 20GA L1.5IN YEL POLYPR HUB S STL REG BVL STR

## (undated) DEVICE — HOOD, PEEL-AWAY: Brand: FLYTE

## (undated) DEVICE — SYSTEM SKIN CLSR 22CM DERMBND PRINEO

## (undated) DEVICE — Device

## (undated) DEVICE — 35 ML SYRINGE LUER-LOCK TIP: Brand: MONOJECT

## (undated) DEVICE — SMARTGOWN SURGICAL GOWN, XL: Brand: CONVERTORS

## (undated) DEVICE — DRESSING FOAM W4XL12IN SIL RECT ADH WTRPRF FLM BK W/ BORD

## (undated) DEVICE — SOLUTION IV IRRIG WATER 1000ML POUR BRL 2F7114

## (undated) DEVICE — COVER,TABLE,HEAVY DUTY,50"X90",STRL: Brand: MEDLINE

## (undated) DEVICE — 3M™ COBAN™ SELF-ADHERENT WRAP, 1586S, STERILE, 6 IN X 5 YD (15 CM X 4,5 M), 12 ROLLS/CASE: Brand: 3M™ COBAN™

## (undated) DEVICE — DRAIN SURG 10FR L1/8IN DIA3.2MM SIL CHN RND HUBLESS FULL

## (undated) DEVICE — SUTURE VCRL + SZ 2-0 L18IN ABSRB UD CT1 L36MM 1/2 CIR VCP839D

## (undated) DEVICE — SUTURE ETHBND EXCEL SZ 2 L30IN NONABSORBABLE GRN L40MM V-37 MX69G

## (undated) DEVICE — SUTURE SURGLON SZ 1 L18IN NONABSORBABLE BLK GS 21 L37MM 1 2 8886196272

## (undated) DEVICE — REFLECTION FLEXIBLE DRILL 25MM: Brand: REFLECTION

## (undated) DEVICE — SUTURE VCRL + SZ 0 L18IN ABSRB UD L36MM CT-1 1/2 CIR VCP840D

## (undated) DEVICE — DECANTER: Brand: UNBRANDED